# Patient Record
Sex: FEMALE | Race: BLACK OR AFRICAN AMERICAN | Employment: FULL TIME | URBAN - METROPOLITAN AREA
[De-identification: names, ages, dates, MRNs, and addresses within clinical notes are randomized per-mention and may not be internally consistent; named-entity substitution may affect disease eponyms.]

---

## 2017-01-25 ENCOUNTER — OFFICE VISIT (OUTPATIENT)
Dept: INTERNAL MEDICINE CLINIC | Age: 62
End: 2017-01-25

## 2017-01-25 VITALS
SYSTOLIC BLOOD PRESSURE: 128 MMHG | OXYGEN SATURATION: 96 % | HEART RATE: 82 BPM | DIASTOLIC BLOOD PRESSURE: 76 MMHG | TEMPERATURE: 96.6 F | RESPIRATION RATE: 18 BRPM | WEIGHT: 187 LBS | HEIGHT: 66 IN | BODY MASS INDEX: 30.05 KG/M2

## 2017-01-25 DIAGNOSIS — D17.1 LIPOMA OF FLANK: Primary | ICD-10-CM

## 2017-01-25 DIAGNOSIS — H65.02 ACUTE SEROUS OTITIS MEDIA OF LEFT EAR, RECURRENCE NOT SPECIFIED: ICD-10-CM

## 2017-01-25 DIAGNOSIS — K43.9 VENTRAL HERNIA WITHOUT OBSTRUCTION OR GANGRENE: ICD-10-CM

## 2017-01-25 NOTE — MR AVS SNAPSHOT
Visit Information Date & Time Provider Department Dept. Phone Encounter #  
 1/25/2017  3:30 PM Janessa Ortiz MD [facility] Internal Medicine [phone] [id number] Follow-up Instructions Return in about 1 month (around 2/25/2017) for complete physical  and fasting blood work. [place] Upcoming Health Maintenance Date Due Hepatitis C Screening 1955 DTaP/Tdap/Td series (1 - Tdap) 9/20/1978 PAP AKA CERVICAL CYTOLOGY 9/20/1978 BREAST CANCER SCRN MAMMOGRAM 9/20/2007 FOBT Q 1 YEAR AGE 50-75 9/20/2007 INFLUENZA AGE 9 TO ADULT 8/1/2016 Allergies as of 1/25/2017  Review Complete On: 1/25/2017 By: Janessa Ortiz MD  
  
 Severity Noted Reaction Type Reactions Other Food  08/07/2015    Other (comments) POTATOES, BLUEBERRIES, CELERY, TOMATO PER ALLERGY TESTING (RECENT 8/7/15) Iodine  11/10/2010    Hives Other Medication  07/17/2013    Unknown (comments) Seafood-nausea, diarrhea, pain  
 Sulfa (Sulfonamide Antibiotics)  11/10/2010    Swelling Current Immunizations  Never Reviewed No immunizations on file. Not reviewed this visit You Were Diagnosed With   
  
 Codes Comments Lipoma of flank    -  Primary ICD-10-CM: D17.1 ICD-9-CM: 214.1 Vitals BP Pulse Temp Resp Height(growth percentile) Weight(growth percentile) 128/76 (BP Patient Position: Sitting) 82 96.6 °F (35.9 °C) (Oral) 18 5' 6\" (1.676 m) 187 lb (84.8 kg) SpO2 BMI OB Status Smoking Status 96% 30.18 kg/m2 Hysterectomy Former Smoker Vitals History BMI and BSA Data Body Mass Index Body Surface Area  
 30.18 kg/m 2 1.99 m 2 Preferred Pharmacy Pharmacy Name Phone [id number] [place] [place] [phone] Your Updated Medication List  
  
   
This list is accurate as of: 1/25/17  4:15 PM.  Always use your most recent med list.  
  
  
  
  
 Biotin 2,500 mcg Cap Take  by mouth.  
  
 clobetasol 0.05 % topical cream  
Commonly known as:  Waldorf Laila Apply  to affected area two (2) times a day. desloratadine 5 mg tablet Commonly known as:  CLARINEX Take 5 mg by mouth daily as needed for Allergies. HYDROcodone-acetaminophen 5-325 mg per tablet Commonly known as:  Delano Dolphin Take 1 Tab by mouth every four (4) hours as needed for Pain. Max Daily Amount: 6 Tabs. WOMEN'S 50 + VITAPAK 400 mcg/200 mg -240 mg Cmpk Generic drug:  mv-FA-dha-epa-fish-veronica-D3-gink Take  by mouth. Follow-up Instructions Return in about 1 month (around 2/25/2017) for complete physical  and fasting blood work. Jessika Coates Introducing [facility]! Janki Ruff introduces E[employer]-SA[employer] patient portal. Now you can access parts of your medical record, email your doctor's office, and request medication refills online. 1. In your internet browser, go to [email or web address] 2. Click on the First Time User? Click Here link in the Sign In box. You will see the New Member Sign Up page. 3. Enter your ECO-SAFE Access Code exactly as it appears below. You will not need to use this code after youve completed the sign-up process. If you do not sign up before the expiration date, you must request a new code. · ECO-[employer]FE Access Code: [email or web address] Expires: 4/25/2017  4:13 PM 
 
4. Enter the last four digits of your Social Security Number (xxxx) and Date of Birth (mm/dd/yyyy) as indicated and click Submit. You will be taken to the next sign-up page. 5. Create a ECO-SAFE ID. This will be your ECO-SAFE login ID and cannot be changed, so think of one that is secure and easy to remember. 6. Create a ECO-SAFE password. You can change your password at any time. 7. Enter your Password Reset Question and Answer. This can be used at a later time if you forget your password. 8. Enter your e-mail address.  You will receive e-mail notification when new information is available in [employer]. 9. Click Sign Up. You can now view and download portions of your medical record. 10. Click the Download Summary menu link to download a portable copy of your medical information. If you have questions, please visit the Frequently Asked Questions section of the [employer] website. Remember, [employer] is NOT to be used for urgent needs. For medical emergencies, dial 911. Now available from your iPhone and Android! Please provide this summary of care documentation to your next provider. Your primary care clinician is listed as Edgar Be. If you have any questions after today's visit, please call [phone].

## 2017-01-25 NOTE — PROGRESS NOTES
Subjective:     Chief Complaint   Patient presents with    Back Pain     for the past few days         She  is a 63y.o. year old female who presents as a new patient to establish and discuss bout some concern. She reports that initially she made the appointment for back pain but back pain been resolved. H/O removal of lipoma and repair of Ventral hernia in 8/2015. She states that she thinks the hernia is getting bigger but it is not painful or bothering. She also sating the the area where the lipoma was removed from is getting bigger as well. No pain but feels uncomfortable. She also mentioned that she has a h/o ear wax in left ear. Recently her left ear feeling clogged. Pertinent items are noted in HPI. Objective:     Vitals:    01/25/17 1544   BP: 128/76   Pulse: 82   Resp: 18   Temp: 96.6 °F (35.9 °C)   TempSrc: Oral   SpO2: 96%   Weight: 187 lb (84.8 kg)   Height: 5' 6\" (1.676 m)       Physical Examination: General appearance - alert, well appearing, and in no distress, oriented to person, place, and time and overweight  Mental status - alert, oriented to person, place, and time, normal mood, behavior, speech, dress, motor activity, and thought processes  Ears - right ear normal, small serous fluid noted in left ear. Nose - normal and patent, no erythema, discharge or polyps  Mouth - mucous membranes moist, pharynx normal without lesions  Neck - supple, no significant adenopathy  Chest - clear to auscultation, no wheezes, rales or rhonchi, symmetric air entry  Heart - normal rate, regular rhythm, normal S1, S2, no murmurs, rubs, clicks or gallops  Abdomen - soft, nontender, nondistended, no masses or organomegaly. Slight visible swollen right abdominal wall then the left but no obvious hernia noted. Back exam - full range of motion, no tenderness, palpable spasm or pain on motion. 7-8 cm surgical scar ebony noted in the right flank area. Non tender.  Most like the lipoma is coming back or may the scar tissue is bothering her. Allergies   Allergen Reactions    Other Food Other (comments)     POTATOES, BLUEBERRIES, CELERY, TOMATO PER ALLERGY TESTING (RECENT 8/7/15)    Iodine Hives    Other Medication Unknown (comments)     Seafood-nausea, diarrhea, pain    Sulfa (Sulfonamide Antibiotics) Swelling      Social History     Social History    Marital status:      Spouse name: N/A    Number of children: N/A    Years of education: N/A     Social History Main Topics    Smoking status: Former Smoker     Packs/day: 1.00     Years: 6.00     Types: Cigarettes     Quit date: 1/1/2002    Smokeless tobacco: Never Used    Alcohol use 2.4 oz/week     4 Shots of liquor per week      Comment: social    Drug use: No    Sexual activity: Not Asked     Other Topics Concern    None     Social History Narrative      Family History   Problem Relation Age of Onset    Anesth Problems Neg Hx       Past Surgical History   Procedure Laterality Date    Endoscopy, colon, diagnostic       2007    Hx breast biopsy       4 benign breast surgeries-2 on each side    Hx other surgical       CYST EXCISIONS FROM BIRTH CANAL    Hx orthopaedic Bilateral      BONE REMOVAL FROM INNER ASPECT BOTH FEET    Hx lipoma resection  8/14/15     excision of lipomas right flank and right supraumbilical region    Hx gyn  2007     partial hysterectomy. fibroid. Past Medical History   Diagnosis Date    Ill-defined condition 8/7/15     ALLERGIC REACTION CAUSING MANY DARK SPOTS MAINLY ON ARMS & LEGS, PT MEDICATING FOR ITCHING; DENSE SKIN-COLORED RASH ON UPPER BACK    Lipoma of back 7/17/2013    Other ill-defined conditions(799.89)      FIBROCYSTIC BREAST DISEASE; OTHER CYSTS      Current Outpatient Prescriptions   Medication Sig Dispense Refill    mv-FA-dha-epa-fish-veronica-D3-gink (WOMEN'S 50 + VITAPAK) 400 mcg/200 mg -240 mg cmpk Take  by mouth.  Biotin 2,500 mcg cap Take  by mouth.       clobetasol (TEMOVATE) 0.05 % topical cream Apply  to affected area two (2) times a day.  desloratadine (CLARINEX) 5 mg tablet Take 5 mg by mouth daily as needed for Allergies. Assessment/ Plan:   Desi Ku was seen today for back pain. Diagnoses and all orders for this visit:    Lipoma of flank  -   Advised to use a binder or support. Advised not to have any surgery unless it really painful. Ventral hernia without obstruction or gangrene    -  counseled patient and discussed the warning signs when to seek med attention. She will call her surgeon if starts noticing any change. -    Rdoy Carlos or abdominal support discussed. Acute serous otitis media of left ear, recurrence not specified    -  offerred decongestant but she says she does not like to take any med. Will let me know if its worse,          Medication risks/benefits/costs/interactions/alternatives discussed with patient. Advised patient to call back or return to office if symptoms worsen/change/persist. If patient cannot reach us or should anything more severe/urgent arise he/she should proceed directly to the nearest emergency department. Discussed expected course/resolution/complications of diagnosis in detail with patient. Patient given a written after visit summary which includes her diagnoses, current medications and vitals. Patient expressed understanding with the diagnosis and plan. Follow-up Disposition:  Return in about 1 month (around 2/25/2017) for complete physical  and fasting blood work. Nayeli Barraza

## 2018-05-23 ENCOUNTER — OFFICE VISIT (OUTPATIENT)
Dept: INTERNAL MEDICINE CLINIC | Age: 63
End: 2018-05-23

## 2018-05-23 VITALS
HEART RATE: 84 BPM | TEMPERATURE: 97.7 F | WEIGHT: 176.6 LBS | HEIGHT: 66 IN | DIASTOLIC BLOOD PRESSURE: 89 MMHG | OXYGEN SATURATION: 99 % | BODY MASS INDEX: 28.38 KG/M2 | SYSTOLIC BLOOD PRESSURE: 158 MMHG | RESPIRATION RATE: 18 BRPM

## 2018-05-23 DIAGNOSIS — M62.838 TRAPEZIUS MUSCLE SPASM: Primary | ICD-10-CM

## 2018-05-23 DIAGNOSIS — R03.0 ELEVATED BLOOD PRESSURE READING: ICD-10-CM

## 2018-05-23 DIAGNOSIS — M54.2 NECK PAIN: ICD-10-CM

## 2018-05-23 RX ORDER — KETOROLAC TROMETHAMINE 10 MG/1
10 TABLET, FILM COATED ORAL
Qty: 20 TAB | Refills: 0 | Status: SHIPPED | OUTPATIENT
Start: 2018-05-23

## 2018-05-23 RX ORDER — KETOROLAC TROMETHAMINE 30 MG/ML
60 INJECTION, SOLUTION INTRAMUSCULAR; INTRAVENOUS ONCE
Qty: 1 VIAL | Refills: 0
Start: 2018-05-23 | End: 2018-05-23

## 2018-05-23 RX ORDER — CYCLOBENZAPRINE HCL 10 MG
10 TABLET ORAL
Qty: 21 TAB | Refills: 0 | Status: SHIPPED | OUTPATIENT
Start: 2018-05-23

## 2018-05-23 NOTE — MR AVS SNAPSHOT
[facility]. [address] 
[phone] Patient: Benjamin Michael MRN: [id number] [facility]:[birth year] Visit Information Date & Time Provider Department Dept. Phone Encounter #  
 5/23/2018 11:30 AM Edgar Nelson MD [facility] Internal Medicine [phone] [id number] Upcoming Health Maintenance Date Due Hepatitis C Screening 1955 DTaP/Tdap/Td series (1 - Tdap) 9/20/1978 PAP AKA CERVICAL CYTOLOGY 9/20/1978 BREAST CANCER SCRN MAMMOGRAM 9/20/2007 FOBT Q 1 YEAR AGE 50-75 9/20/2007 ZOSTER VACCINE AGE 60> 7/20/2017 Influenza Age 5 to Adult 8/1/2018 Allergies as of 5/23/2018  Review Complete On: 5/23/2018 By: Aminata Rondon LPN Severity Noted Reaction Type Reactions Other Food  08/07/2015    Other (comments) POTATOES, BLUEBERRIES, CELERY, TOMATO PER ALLERGY TESTING (RECENT 8/7/15) Iodine  11/10/2010    Hives Other Medication  07/17/2013    Unknown (comments) Seafood-nausea, diarrhea, pain  
 Sulfa (Sulfonamide Antibiotics)  11/10/2010    Swelling Current Immunizations  Never Reviewed No immunizations on file. Not reviewed this visit You Were Diagnosed With   
  
 Codes Comments Trapezius muscle spasm    -  Primary ICD-10-CM: U48.802 ICD-9-CM: 728.85 Neck pain     ICD-10-CM: M54.2 ICD-9-CM: 723.1 Vitals BP Pulse Temp Resp Height(growth percentile) 158/89 (BP 1 Location: Left arm, BP Patient Position: Sitting) 84 97.7 °F (36.5 °C) (Temporal) 18 5' 6\" (1.676 m) Weight(growth percentile) SpO2 BMI OB Status Smoking Status 176 lb 9.6 oz (80.1 kg) 99% 28.5 kg/m2 Hysterectomy Former Smoker Vitals History BMI and BSA Data Body Mass Index Body Surface Area 28.5 kg/m 2 1.93 m 2 [id number] [place] [facility] [phone] Your Updated Medication List  
  
   
This list is accurate as of 5/23/18 12:28 PM.  Always use your most recent med list.  
  
  
  
  
 Biotin 2,500 mcg Cap Take  by mouth.  
  
 clobetasol 0.05 % topical cream  
Commonly known as:  Rejeana Jerry Apply  to affected area two (2) times a day. cyclobenzaprine 10 mg tablet Commonly known as:  FLEXERIL Take 1 Tab by mouth three (3) times daily as needed for Muscle Spasm(s). desloratadine 5 mg tablet Commonly known as:  CLARINEX Take 5 mg by mouth daily as needed for Allergies. * ketorolac 10 mg tablet Commonly known as:  TORADOL Take 1 Tab by mouth three (3) times daily as needed for Pain. * ketorolac tromethamine 60 mg/2 mL Soln Commonly known as:  TORADOL  
2 mL by IntraMUSCular route once for 1 dose. WOMEN'S 50 + VITAPAK 400 mcg/200 mg -240 mg Cmpk Generic drug:  mv-FA-dha-epa-fish-veronica-D3-gink Take  by mouth. * Notice: This list has 2 medication(s) that are the same as other medications prescribed for you. Read the directions carefully, and ask your doctor or other care provider to review them with you. Prescriptions Sent to Pharmacy Refills  
 ketorolac (TORADOL) 10 mg tablet 0 Sig: Take 1 Tab by mouth three (3) times daily as needed for Pain. Class: Normal  
 Pharmacy: [address] [place] [address] #: [phone] Route: Oral  
 cyclobenzaprine (FLEXERIL) 10 mg tablet 0 Sig: Take 1 Tab by mouth three (3) times daily as needed for Muscle Spasm(s). Class: Normal  
 Pharmacy: [address] [place] [address] Ph #: [phone] Route: Oral  
  
We Performed the Following KETOROLAC TROMETHAMINE INJ [ [facility]] DE THER/PROPH/DIAG INJECTION, SUBCUT/IM [id number] CPT(R)] Patient Instructions Neck Spasm: Care Instructions Your Care Instructions A neck spasm is sudden tightness and pain in your neck muscles. A spasm may be caused by some activities or repeated movements. For example, you may be more likely to have a neck spasm if you slouch, paint a ceiling, work at a computer, or sleep with your neck twisted. But the cause isn't always clear. Home treatment includes using heat or ice, taking over-the-counter (OTC) pain medicines, and avoiding activities that may lead to neck pain. Gentle stretching, or treatments such as massage or manipulation, may also help ease a neck spasm. For a neck spasm that doesn't get better with home care, your doctor may prescribe medicine. He or she may also suggest exercise or physical therapy to help strengthen or relax your neck muscles. Follow-up care is a key part of your treatment and safety. Be sure to make and go to all appointments, and call your doctor if you are having problems. It's also a good idea to know your test results and keep a list of the medicines you take. How can you care for yourself at home? · To relieve pain, use heat or ice (whichever feels better) on the affected area. ¨ Put a warm water bottle, a heating pad set on low, or a warm cloth on your neck. Put a thin cloth between the heating pad and your skin. Do not go to sleep with a heating pad on your skin. ¨ Try ice or a cold pack on the area for 10 to 20 minutes at a time. Put a thin cloth between the ice and your skin. · Ask your doctor if you can take acetaminophen (such as Tylenol) or nonsteroidal anti-inflammatory drugs, such as ibuprofen or naproxen. Your doctor can prescribe stronger medicines if needed. Be safe with medicines. Read and follow all instructions on the label. · Stretch your muscles every day, especially before and after exercise and at bedtime. Regular stretching can help relax your muscles.  
· Try to find a pillow and a position in bed that help improve your night's rest. 
 · Try to stay active. It's best to start activity slowly. If an exercise makes your painworse, stop doing it. When should you call for help? Call 911 anytime you think you may need emergency care. For example, call if: 
? · You are unable to move an arm or a leg at all. ?Call your doctor now or seek immediate medical care if: 
? · You have new or worse symptoms in your arms, legs, belly, or buttocks. Symptoms may include: ¨ Numbness or tingling. ¨ Weakness. ¨ Pain. ? · You lose bladder or bowel control. ? Watch closely for changes in your health, and be sure to contact your doctor if: 
? · You do not get better as expected. Where can you learn more? Go to http://ernesto-shama.info/. Enter P875 in the search box to learn more about \"Neck Spasm: Care Instructions. \" Current as of: March 21, 2017 Content Version: 11.4 © 8273-0193 Exam[employer]. Care instructions adapted under license by [employer] (which disclaims liability or warranty for this information). If you have questions about a medical condition or this instruction, always ask your healthcare professional. [place] [address] any warranty or liability for your use of this information. Introducing [facility]! [employer] introduces [employer] patient portal. Now you can access parts of your medical record, email your doctor's office, and request medication refills online. 1. In your internet browser, go to [email or web address] 2. Click on the First Time User? Click Here link in the Sign In box. You will see the New Member Sign Up page. 3. Enter your [employer] Access Code exactly as it appears below. You will not need to use this code after youve completed the sign-up process. If you do not sign up before the expiration date, you must request a new code. · [employer] Access Code: [email or web address] Expires: 8/21/2018 12:03 PM 
 
 4. Enter the last four digits of your Social Security Number (xxxx) and Date of Birth (mm/dd/yyyy) as indicated and click Submit. You will be taken to the next sign-up page. 5. Create a [employer] ID. This will be your [employer] login ID and cannot be changed, so think of one that is secure and easy to remember. 6. Create a [employer] password. You can change your password at any time. 7. Enter your Password Reset Question and Answer. This can be used at a later time if you forget your password. 8. Enter your e-mail address. You will receive e-mail notification when new information is available in [address] 9. Click Sign Up. You can now view and download portions of your medical record. 10. Click the Download Summary menu link to download a portable copy of your medical information. If you have questions, please visit the Frequently Asked Questions section of the [employer] website. Remember, [employer] is NOT to be used for urgent needs. For medical emergencies, dial 911. Now available from your iPhone and Android! Please provide this summary of care documentation to your next provider. Your primary care clinician is listed as Edgar Be. If you have any questions after today's visit, please call [phone].

## 2018-05-23 NOTE — PROGRESS NOTES
Subjective:     Chief Complaint   Patient presents with    Neck Pain     upper back, lower neck pain since saturday, been getting worst. Radiates to back of head, going down arm, hard to turn head        She  is a 63y.o. year old female with no significant medical history who presents with a c/o severe sharp pain in her left side of the back of the neck started on Saturday morning upon awakening up. States that pain is getting worst . Pain radiates to her back of the left shoulder and pain ful to turn head any direction. Pertinent items are noted in HPI. Objective:     Vitals:    05/23/18 1145 05/23/18 1210 05/23/18 1212 05/23/18 1227   BP: (!) 137/113 145/84 160/88 158/89   Pulse: 84      Resp: 18      Temp: 97.7 °F (36.5 °C)      TempSrc: Temporal      SpO2: 99%      Weight: 176 lb 9.6 oz (80.1 kg)      Height: 5' 6\" (1.676 m)          Physical Examination: General appearance - alert, well appearing, and in pain. oriented to person, place, and time and normal appearing weight  Mental status - alert, oriented to person, place, and time  Ears - bilateral TM's and external ear canals normal  Neck - supple, no significant adenopathy. No swelling, no rash, no redness noted. TTP over left cervical paraspinal and trapezius muscle any ROM is painful.    Neurological - alert, oriented, normal speech, no focal findings or movement disorder noted  Musculoskeletal - shoulder exam is normal.     Allergies   Allergen Reactions    Other Food Other (comments)     POTATOES, BLUEBERRIES, CELERY, TOMATO PER ALLERGY TESTING (RECENT 8/7/15)    Iodine Hives    Other Medication Unknown (comments)     Seafood-nausea, diarrhea, pain    Sulfa (Sulfonamide Antibiotics) Swelling      Social History     Social History    Marital status:      Spouse name: N/A    Number of children: N/A    Years of education: N/A     Social History Main Topics    Smoking status: Former Smoker     Packs/day: 1.00     Years: 6.00     Types: Cigarettes     Quit date: 1/1/2002    Smokeless tobacco: Never Used    Alcohol use 2.4 oz/week     4 Shots of liquor per week      Comment: social    Drug use: No    Sexual activity: Not Asked     Other Topics Concern    None     Social History Narrative      Family History   Problem Relation Age of Onset    Anesth Problems Neg Hx       Past Surgical History:   Procedure Laterality Date    ENDOSCOPY, COLON, DIAGNOSTIC      2007    HX BREAST BIOPSY      4 benign breast surgeries-2 on each side    HX GYN  2007    partial hysterectomy. fibroid.  HX LIPOMA RESECTION  8/14/15    excision of lipomas right flank and right supraumbilical region    HX ORTHOPAEDIC Bilateral     BONE REMOVAL FROM INNER ASPECT BOTH FEET    HX OTHER SURGICAL      CYST EXCISIONS FROM BIRTH CANAL      Past Medical History:   Diagnosis Date    Ill-defined condition 8/7/15    ALLERGIC REACTION CAUSING MANY DARK SPOTS MAINLY ON ARMS & LEGS, PT MEDICATING FOR ITCHING; DENSE SKIN-COLORED RASH ON UPPER BACK    Lipoma of back 7/17/2013    Other ill-defined conditions(799.89)     FIBROCYSTIC BREAST DISEASE; OTHER CYSTS      Current Outpatient Prescriptions   Medication Sig Dispense Refill    ketorolac (TORADOL) 10 mg tablet Take 1 Tab by mouth three (3) times daily as needed for Pain. 20 Tab 0    cyclobenzaprine (FLEXERIL) 10 mg tablet Take 1 Tab by mouth three (3) times daily as needed for Muscle Spasm(s). 21 Tab 0    ketorolac tromethamine (TORADOL) 60 mg/2 mL soln 2 mL by IntraMUSCular route once for 1 dose. 1 Vial 0    mv-FA-dha-epa-fish-veronica-D3-gink (WOMEN'S 50 + VITAPAK) 400 mcg/200 mg -240 mg cmpk Take  by mouth.  Biotin 2,500 mcg cap Take  by mouth.  clobetasol (TEMOVATE) 0.05 % topical cream Apply  to affected area two (2) times a day.  desloratadine (CLARINEX) 5 mg tablet Take 5 mg by mouth daily as needed for Allergies. Assessment/ Plan:   Diagnoses and all orders for this visit:    1.  Trapezius muscle spasm  -     ketorolac (TORADOL) 10 mg tablet; Take 1 Tab by mouth three (3) times daily as needed for Pain. -     cyclobenzaprine (FLEXERIL) 10 mg tablet; Take 1 Tab by mouth three (3) times daily as needed for Muscle Spasm(s). -     ketorolac tromethamine (TORADOL) 60 mg/2 mL soln; 2 mL by IntraMUSCular route once for 1 dose. -     KETOROLAC TROMETHAMINE INJ  -     MS THER/PROPH/DIAG INJECTION, SUBCUT/IM         -   heat and ice therapy. 2. Neck pain  -     ketorolac (TORADOL) 10 mg tablet; Take 1 Tab by mouth three (3) times daily as needed for Pain.  -     ketorolac tromethamine (TORADOL) 60 mg/2 mL soln; 2 mL by IntraMUSCular route once for 1 dose. -     KETOROLAC TROMETHAMINE INJ  -     MS THER/PROPH/DIAG INJECTION, SUBCUT/IM    3. Elevated blood pressure reading       No h/o elevated blood pressure in the past. Pt is in pain. Advised to start monitoring BP daily and bring records if BP stays over 140/90s. Medication risks/benefits/costs/interactions/alternatives discussed with patient. Advised patient to call back or return to office if symptoms worsen/change/persist. If patient cannot reach us or should anything more severe/urgent arise he/she should proceed directly to the nearest emergency department. Discussed expected course/resolution/complications of diagnosis in detail with patient. Patient given a written after visit summary which includes her diagnoses, current medications and vitals. Patient expressed understanding with the diagnosis and plan.        Follow-up Disposition: Not on File

## 2018-05-23 NOTE — PATIENT INSTRUCTIONS
Neck Spasm: Care Instructions  Your Care Instructions  A neck spasm is sudden tightness and pain in your neck muscles. A spasm may be caused by some activities or repeated movements. For example, you may be more likely to have a neck spasm if you slouch, paint a ceiling, work at a computer, or sleep with your neck twisted. But the cause isn't always clear. Home treatment includes using heat or ice, taking over-the-counter (OTC) pain medicines, and avoiding activities that may lead to neck pain. Gentle stretching, or treatments such as massage or manipulation, may also help ease a neck spasm. For a neck spasm that doesn't get better with home care, your doctor may prescribe medicine. He or she may also suggest exercise or physical therapy to help strengthen or relax your neck muscles. Follow-up care is a key part of your treatment and safety. Be sure to make and go to all appointments, and call your doctor if you are having problems. It's also a good idea to know your test results and keep a list of the medicines you take. How can you care for yourself at home? · To relieve pain, use heat or ice (whichever feels better) on the affected area. ¨ Put a warm water bottle, a heating pad set on low, or a warm cloth on your neck. Put a thin cloth between the heating pad and your skin. Do not go to sleep with a heating pad on your skin. ¨ Try ice or a cold pack on the area for 10 to 20 minutes at a time. Put a thin cloth between the ice and your skin. · Ask your doctor if you can take acetaminophen (such as Tylenol) or nonsteroidal anti-inflammatory drugs, such as ibuprofen or naproxen. Your doctor can prescribe stronger medicines if needed. Be safe with medicines. Read and follow all instructions on the label. · Stretch your muscles every day, especially before and after exercise and at bedtime. Regular stretching can help relax your muscles.   · Try to find a pillow and a position in bed that help improve your night's rest.  · Try to stay active. It's best to start activity slowly. If an exercise makes your painworse, stop doing it. When should you call for help? Call 911 anytime you think you may need emergency care. For example, call if:  ? · You are unable to move an arm or a leg at all. ?Call your doctor now or seek immediate medical care if:  ? · You have new or worse symptoms in your arms, legs, belly, or buttocks. Symptoms may include:  ¨ Numbness or tingling. ¨ Weakness. ¨ Pain. ? · You lose bladder or bowel control. ? Watch closely for changes in your health, and be sure to contact your doctor if:  ? · You do not get better as expected. Where can you learn more? Go to http://ernesto-shama.info/. Enter B343 in the search box to learn more about \"Neck Spasm: Care Instructions. \"  Current as of: March 21, 2017  Content Version: 11.4  © 4122-9633 [employer]. Care instructions adapted under license by [employer] (which disclaims liability or warranty for this information). If you have questions about a medical condition or this instruction, always ask your healthcare professional. [place] [address] any warranty or liability for your use of this information.

## 2019-12-09 ENCOUNTER — HOSPITAL ENCOUNTER (OUTPATIENT)
Dept: GENERAL RADIOLOGY | Age: 64
Discharge: HOME | End: 2019-12-09
Payer: COMMERCIAL

## 2019-12-09 ENCOUNTER — OFFICE VISIT (OUTPATIENT)
Dept: PRIMARY CARE CLINIC | Age: 64
End: 2019-12-09

## 2019-12-09 VITALS
SYSTOLIC BLOOD PRESSURE: 148 MMHG | OXYGEN SATURATION: 100 % | WEIGHT: 185 LBS | HEIGHT: 66 IN | RESPIRATION RATE: 16 BRPM | BODY MASS INDEX: 29.73 KG/M2 | TEMPERATURE: 99.3 F | DIASTOLIC BLOOD PRESSURE: 69 MMHG | HEART RATE: 85 BPM

## 2019-12-09 DIAGNOSIS — M54.6 LEFT-SIDED THORACIC BACK PAIN, UNSPECIFIED CHRONICITY: ICD-10-CM

## 2019-12-09 DIAGNOSIS — R05.9 COUGH: ICD-10-CM

## 2019-12-09 DIAGNOSIS — M54.6 LEFT-SIDED THORACIC BACK PAIN, UNSPECIFIED CHRONICITY: Primary | ICD-10-CM

## 2019-12-09 PROCEDURE — 72072 X-RAY EXAM THORAC SPINE 3VWS: CPT

## 2019-12-09 RX ORDER — BENZONATATE 200 MG/1
200 CAPSULE ORAL
Qty: 21 CAP | Refills: 0 | Status: SHIPPED | OUTPATIENT
Start: 2019-12-09 | End: 2019-12-16

## 2019-12-09 RX ORDER — KETOROLAC TROMETHAMINE 30 MG/ML
60 INJECTION, SOLUTION INTRAMUSCULAR; INTRAVENOUS ONCE
Qty: 1 VIAL | Refills: 0
Start: 2019-12-09 | End: 2019-12-09

## 2019-12-09 RX ORDER — IBUPROFEN 800 MG/1
800 TABLET ORAL
Qty: 21 TAB | Refills: 0 | Status: SHIPPED | OUTPATIENT
Start: 2019-12-09

## 2019-12-09 NOTE — PROGRESS NOTES
[facility]   [address] [facility], [facility]  P: [phone]  F: [phone]      Chief Complaint   Patient presents with    Back Pain     patient states that she has had upper left back pain for 2 weeks and it is not getting better. tryine to get a cold too and would like some cough medication. Hazel Lipscomb is a 60 y.o. female who presents to clinic for Back Pain (patient states that she has had upper left back pain for 2 weeks and it is not getting better. tryine to get a cold too and would like some cough medication. ). HPI:    Erin Decker is a 82-year-old female who presents with 2 weeks of mid left-sided back pain. She denies any known injury, but endorses pain that is persisting and worsening over the last 2 weeks. She denies any other associated symptoms today. She has intermittently taken some Advil with benefit. She notes she does carry two heavy bags to work daily. Pt denies neck pain, headache, abdominal pain, saddle anesthesia, bowel/bladder issues. Patient has no significant red flags for low back pain including no history of cancer, corticosteroid use, abnormal neurologic physical findings (including new ataxia and/or difficulty walking), or anticoagulant use. She also endorses 1 week of cold symptoms including cough, nasal congestion, and has a low-grade temperature today of 99.3F. She has not taken any OTC medicine and is requesting cough medicine today.      Patient Active Problem List    Diagnosis    Ventral hernia    Lipoma of back          Past Medical History:   Diagnosis Date    Ill-defined condition 8/7/15    ALLERGIC REACTION CAUSING MANY DARK SPOTS MAINLY ON ARMS & LEGS, PT MEDICATING FOR ITCHING; DENSE SKIN-COLORED RASH ON UPPER BACK    Lipoma of back 7/17/2013    Other ill-defined conditions(799.89)     FIBROCYSTIC BREAST DISEASE; OTHER CYSTS     Past Surgical History:   Procedure Laterality Date    ENDOSCOPY, COLON, DIAGNOSTIC          HX BREAST BIOPSY      4 benign breast surgeries-2 on each side    HX GYN  2007    partial hysterectomy. fibroid.  HX LIPOMA RESECTION  8/14/15    excision of lipomas right flank and right supraumbilical region    HX ORTHOPAEDIC Bilateral     BONE REMOVAL FROM INNER ASPECT BOTH FEET    HX OTHER SURGICAL      CYST EXCISIONS FROM BIRTH CANAL     Social History     Socioeconomic History    Marital status:      Spouse name: Not on file    Number of children: Not on file    Years of education: Not on file    Highest education level: Not on file   Occupational History    Not on file   Social Needs    Financial resource strain: Not on file    Food insecurity:     Worry: Not on file     Inability: Not on file    Transportation needs:     Medical: Not on file     Non-medical: Not on file   Tobacco Use    Smoking status: Former Smoker     Packs/day: 1.00     Years: 6.00     Pack years: 6.00     Types: Cigarettes     Last attempt to quit: 2002     Years since quittin.9    Smokeless tobacco: Never Used   Substance and Sexual Activity    Alcohol use:  Yes     Alcohol/week: 4.0 standard drinks     Types: 4 Shots of liquor per week     Comment: social    Drug use: No    Sexual activity: Not on file   Lifestyle    Physical activity:     Days per week: Not on file     Minutes per session: Not on file    Stress: Not on file   Relationships    Social connections:     Talks on phone: Not on file     Gets together: Not on file     Attends [employer] service: Not on file     Active member of club or organization: Not on file     Attends meetings of clubs or organizations: Not on file     Relationship status: Not on file    Intimate partner violence:     Fear of current or ex partner: Not on file     Emotionally abused: Not on file     Physically abused: Not on file     Forced sexual activity: Not on file   Other Topics Concern    Not on file   Social History Narrative    Not on file     Family History   Problem Relation Age of Onset    Anesth Problems Neg Hx      Allergies   Allergen Reactions    Other Food Other (comments)     POTATOES, BLUEBERRIES, CELERY, TOMATO PER ALLERGY TESTING (RECENT 8/7/15)    Iodine Hives    Other Medication Unknown (comments)     Seafood-nausea, diarrhea, pain    Sulfa (Sulfonamide Antibiotics) Swelling       Current Outpatient Medications   Medication Sig Dispense Refill    benzonatate (TESSALON) 200 mg capsule Take 1 Cap by mouth three (3) times daily as needed for Cough for up to 7 days. 21 Cap 0    ibuprofen (MOTRIN) 800 mg tablet Take 1 Tab by mouth every eight (8) hours as needed for Pain. 21 Tab 0    ketorolac (TORADOL) 10 mg tablet Take 1 Tab by mouth three (3) times daily as needed for Pain. 20 Tab 0    cyclobenzaprine (FLEXERIL) 10 mg tablet Take 1 Tab by mouth three (3) times daily as needed for Muscle Spasm(s). 21 Tab 0    mv-FA-dha-epa-fish-veronica-D3-gink (WOMEN'S 50 + VITAPAK) 400 mcg/200 mg -240 mg cmpk Take  by mouth.  Biotin 2,500 mcg cap Take  by mouth.  clobetasol (TEMOVATE) 0.05 % topical cream Apply  to affected area two (2) times a day.  desloratadine (CLARINEX) 5 mg tablet Take 5 mg by mouth daily as needed for Allergies. The medications were reviewed and updated in the medical record. The past medical history, past surgical history, and family history were reviewed and updated in the medical record. REVIEW OF SYSTEMS   Review of Systems   Constitutional: Negative for malaise/fatigue. HENT: Positive for congestion. Eyes: Negative for blurred vision and pain. Respiratory: Positive for cough. Negative for shortness of breath. Cardiovascular: Negative for chest pain and palpitations. Gastrointestinal: Negative for abdominal pain and heartburn. Genitourinary: Negative for frequency and urgency. Musculoskeletal: Positive for back pain and myalgias. Negative for joint pain.    Neurological: Negative for dizziness, tingling, sensory change, weakness and headaches. Psychiatric/Behavioral: Negative for depression, memory loss and substance abuse. PHYSICAL EXAM     Visit Vitals  /69 (BP 1 Location: Left arm, BP Patient Position: Sitting)   Pulse 85   Temp 99.3 °F (37.4 °C) (Oral)   Resp 16   Ht 5' 6\" (1.676 m)   Wt 185 lb (83.9 kg)   SpO2 100%   BMI 29.86 kg/m²       Physical Exam  Vitals signs and nursing note reviewed. HENT:      Head: Normocephalic and atraumatic. Right Ear: External ear normal.      Left Ear: External ear normal.   Cardiovascular:      Rate and Rhythm: Normal rate and regular rhythm. Heart sounds: Normal heart sounds. Pulmonary:      Effort: Pulmonary effort is normal.      Breath sounds: Normal breath sounds. Musculoskeletal: Normal range of motion. Thoracic back: She exhibits pain and spasm. Comments: Left mid back TTP, no sciatica. Skin:     General: Skin is warm and dry. Neurological:      Mental Status: She is alert and oriented to person, place, and time. Gait: Gait is intact. Deep Tendon Reflexes:      Reflex Scores:       Patellar reflexes are 2+ on the right side and 2+ on the left side. Psychiatric:         Mood and Affect: Affect normal.         Judgment: Judgment normal.       ASSESSMENT/ PLAN   Diagnoses and all orders for this visit:    1. Left-sided thoracic back pain, unspecified chronicity  -     XR SPINE THORAC 3 V; Future  -     ibuprofen (MOTRIN) 800 mg tablet; Take 1 Tab by mouth every eight (8) hours as needed for Pain.  -     ketorolac tromethamine (TORADOL) 60 mg/2 mL soln; 2 mL by IntraMUSCular route once for 1 dose. -     KETOROLAC TROMETHAMINE INJ  -     LA THER/PROPH/DIAG INJECTION, SUBCUT/IM  -Rest, ice, given IM Toradol in office and provided Motrin to take for the next several days. Patient requesting imaging. 2. Cough  -     benzonatate (TESSALON) 200 mg capsule;  Take 1 Cap by mouth three (3) times daily as needed for Cough for up to 7 days.   -Return to office if not improving. Disclaimer:  Advised patient to call back or return to office if symptoms worsen/change/persist.  Discussed expected course/resolution/complications of diagnosis in detail with patient.     Medication risks/benefits/alternatives discussed with patient. Patient was given an after visit summary which includes diagnoses, current medications, & vitals.      Discussed patient instructions and advised to read to all patient instructions regarding care.      Patient expressed understanding with the diagnosis and plan. This note will not be viewable in [address]         Fidel Hurst NP  12/9/2019        (This document has been electronically signed)

## 2019-12-09 NOTE — PROGRESS NOTES
Chief Complaint   Patient presents with    Back Pain     patient states that she has had upper left back pain for 2 weeks and it is not getting better. tryine to get a cold too and would like some cough medication.

## 2019-12-10 NOTE — PROGRESS NOTES
Please call- Xray shows some mild spinal arthritis but no acute fracture. Continue rest and motrin- follow-up if not improving.

## 2019-12-10 NOTE — PROGRESS NOTES
Attempted to call patient x 2, call goes to , however, unable to leave . Result letter sent. End of encounter.

## 2020-12-11 ENCOUNTER — OFFICE VISIT (OUTPATIENT)
Dept: PRIMARY CARE CLINIC | Age: 65
End: 2020-12-11
Payer: COMMERCIAL

## 2020-12-11 VITALS
HEART RATE: 75 BPM | TEMPERATURE: 98.7 F | WEIGHT: 182 LBS | BODY MASS INDEX: 29.25 KG/M2 | RESPIRATION RATE: 16 BRPM | SYSTOLIC BLOOD PRESSURE: 148 MMHG | OXYGEN SATURATION: 100 % | DIASTOLIC BLOOD PRESSURE: 88 MMHG | HEIGHT: 66 IN

## 2020-12-11 DIAGNOSIS — R03.0 ELEVATED BLOOD PRESSURE READING: Primary | ICD-10-CM

## 2020-12-11 DIAGNOSIS — Z12.4 SCREENING FOR MALIGNANT NEOPLASM OF CERVIX: ICD-10-CM

## 2020-12-11 PROCEDURE — 99396 PREV VISIT EST AGE 40-64: CPT | Performed by: NURSE PRACTITIONER

## 2020-12-11 NOTE — PROGRESS NOTES
Hadley Ott is a 63 y.o. female  Chief Complaint   Patient presents with    Complete Physical     Health Maintenance Due   Topic Date Due    Hepatitis C Screening  1955    DTaP/Tdap/Td series (1 - Tdap) 09/20/1978    PAP AKA CERVICAL CYTOLOGY  09/20/1978    Lipid Screen  09/20/1997    Shingrix Vaccine Age 50> (1 of 2) 09/20/2007    Colorectal Cancer Screening Combo  09/20/2007    Flu Vaccine (1) 09/01/2020    Breast Cancer Screen Mammogram  09/19/2020     Visit Vitals  BP (!) 148/88 (BP 1 Location: Right arm, BP Patient Position: Sitting)   Pulse 75   Temp 98.7 °F (37.1 °C) (Temporal)   Resp 16   Ht 5' 6\" (1.676 m)   Wt 182 lb (82.6 kg)   SpO2 100%   BMI 29.38 kg/m²     1. Have you been to the ER, urgent care clinic since your last visit? Hospitalized since your last visit? No    2. Have you seen or consulted any other health care providers outside of the [address] [place] [address] since your last visit? Include any pap smears or colon screening.  No

## 2020-12-11 NOTE — PROGRESS NOTES
Jareth Mathis is a  63 y.o. female presents for visit. Chief Complaint   Patient presents with    Gyn Exam     PAP     HPI  Established patient new to me. Presents for Pap only. Last Pap was more than 3 years ago. She is sexually active with her . Followed by OB/GYN elsewhere. Denies need for mammogram order. No additional concerns. Review of Systems   Constitutional: Negative for chills, fever and malaise/fatigue. HENT: Negative for congestion and sore throat. Respiratory: Negative for cough and shortness of breath. Cardiovascular: Negative for chest pain. Gastrointestinal: Negative for diarrhea, heartburn and nausea. Genitourinary: Negative. Musculoskeletal: Negative for myalgias. Neurological: Negative for headaches. Past Medical History:   Diagnosis Date    Ill-defined condition 8/7/15    ALLERGIC REACTION CAUSING MANY DARK SPOTS MAINLY ON ARMS & LEGS, PT MEDICATING FOR ITCHING; DENSE SKIN-COLORED RASH ON UPPER BACK    Lipoma of back 7/17/2013    Other ill-defined conditions(799.89)     FIBROCYSTIC BREAST DISEASE; OTHER CYSTS     Past Surgical History:   Procedure Laterality Date    ENDOSCOPY, COLON, DIAGNOSTIC      2007    HX BREAST BIOPSY      4 benign breast surgeries-2 on each side    HX GYN  2007    partial hysterectomy. fibroid.     HX LIPOMA RESECTION  8/14/15    excision of lipomas right flank and right supraumbilical region    HX ORTHOPAEDIC Bilateral     BONE REMOVAL FROM INNER ASPECT BOTH FEET    HX OTHER SURGICAL      CYST EXCISIONS FROM BIRTH CANAL    HX WISDOM TEETH EXTRACTION  11/22/2020       Social History     Socioeconomic History    Marital status:      Spouse name: Not on file    Number of children: Not on file    Years of education: Not on file    Highest education level: Not on file   Occupational History    Not on file   Social Needs    Financial resource strain: Not on file    Food insecurity     Worry: Not on file Inability: Not on file    Transportation needs     Medical: Not on file     Non-medical: Not on file   Tobacco Use    Smoking status: Former Smoker     Packs/day: 1.00     Years: 6.00     Pack years: 6.00     Types: Cigarettes     Quit date: 2002     Years since quittin.9    Smokeless tobacco: Never Used   Substance and Sexual Activity    Alcohol use: Yes     Alcohol/week: 4.0 standard drinks     Types: 4 Shots of liquor per week     Comment: social    Drug use: No    Sexual activity: Yes   Lifestyle    Physical activity     Days per week: Not on file     Minutes per session: Not on file    Stress: Not on file   Relationships    Social connections     Talks on phone: Not on file     Gets together: Not on file     Attends [employer] service: Not on file     Active member of club or organization: Not on file     Attends meetings of clubs or organizations: Not on file     Relationship status: Not on file    Intimate partner violence     Fear of current or ex partner: Not on file     Emotionally abused: Not on file     Physically abused: Not on file     Forced sexual activity: Not on file   Other Topics Concern    Not on file   Social History Narrative    Not on file       Family History   Problem Relation Age of Onset    Anesth Problems Neg Hx       Prior to Admission medications    Medication Sig Start Date End Date Taking? Authorizing Provider   ibuprofen (MOTRIN) 800 mg tablet Take 1 Tab by mouth every eight (8) hours as needed for Pain. 19   Parveen Sams NP   ketorolac (TORADOL) 10 mg tablet Take 1 Tab by mouth three (3) times daily as needed for Pain. 18   Edgar Be MD   cyclobenzaprine (FLEXERIL) 10 mg tablet Take 1 Tab by mouth three (3) times daily as needed for Muscle Spasm(s). 18   Edgar Be MD   mv-FA-dha-epa-fish-veronica-D3-gink (WOMEN'S 50 + VITAPAK) 400 mcg/200 mg -240 mg cmpk Take  by mouth. Provider, Historical   Biotin 2,500 mcg cap Take  by mouth. Provider, Historical   clobetasol (TEMOVATE) 0.05 % topical cream Apply  to affected area two (2) times a day. Provider, Historical   desloratadine (CLARINEX) 5 mg tablet Take 5 mg by mouth daily as needed for Allergies. Provider, Historical      Allergies   Allergen Reactions    Other Food Other (comments)     POTATOES, BLUEBERRIES, CELERY, TOMATO PER ALLERGY TESTING (RECENT 8/7/15)    Iodine Hives    Other Medication Unknown (comments)     Seafood-nausea, diarrhea, pain    Sulfa (Sulfonamide Antibiotics) Swelling        Visit Vitals  BP (!) 148/88 (BP 1 Location: Right arm, BP Patient Position: Sitting)   Pulse 75   Temp 98.7 °F (37.1 °C) (Temporal)   Resp 16   Ht 5' 6\" (1.676 m)   Wt 182 lb (82.6 kg)   SpO2 100%   BMI 29.38 kg/m²     Physical Exam  Vitals signs and nursing note reviewed. Exam conducted with a chaperone present. Constitutional:       General: She is not in acute distress. Appearance: She is well-developed. HENT:      Head: Normocephalic and atraumatic. Right Ear: External ear normal.      Left Ear: External ear normal.   Eyes:      Conjunctiva/sclera: Conjunctivae normal.   Cardiovascular:      Rate and Rhythm: Normal rate and regular rhythm. Heart sounds: Normal heart sounds. Pulmonary:      Effort: Pulmonary effort is normal.      Breath sounds: Normal breath sounds. No wheezing. Genitourinary:     General: Normal vulva. Exam position: Lithotomy position. Labia:         Right: No rash. Left: No rash. Vagina: Tenderness present. No vaginal discharge. Skin:     General: Skin is warm and dry. Neurological:      Mental Status: She is alert and oriented to person, place, and time. Psychiatric:         Behavior: Behavior normal.               No results found for this or any previous visit (from the past 24 hour(s)).     Patient Active Problem List    Diagnosis Date Noted    Ventral hernia 07/17/2013    Lipoma of back 07/17/2013 ASSESSMENT AND PLAN:      ICD-10-CM ICD-9-CM   1. Elevated blood pressure reading  R03.0 796.2   2. Screening for malignant neoplasm of cervix  Z12.4 V76.2     Orders Placed This Encounter    PAP IGP,APTIMA HPV AGE GDLN     Order Specific Question:   Pap Source? Answer:   Vaginal     Order Specific Question:   Total Hysterectomy? Answer:   No     Order Specific Question:   Supracervical Hysterectomy? Answer:   Yes     Order Specific Question:   Post Menopausal?     Answer:   Yes     Order Specific Question:   Hormone Therapy? Answer:   No     Order Specific Question:   IUD? Answer:   No     Order Specific Question:   Abnormal Bleeding? Answer:   No     Order Specific Question:   Pregnant     Answer:   No     Order Specific Question:   Post Partum? Answer:   No     Order Specific Question:   Pap collection method? Answer:   broom     Diagnoses and all orders for this visit:    1. Elevated blood pressure reading          -Monitor blood pressure at home. Follow-up in does not return to baseline. 2. Screening for malignant neoplasm of cervix  -     PAP IGP,APTIMA HPV AGE GDLN        lab results and schedule of future lab studies reviewed with patient          Disclaimer:  Advised her to call back or return to office if symptoms worsen/change/persist.  Discussed expected course/resolution/complications of diagnosis in detail with patient. Medication risks/benefits/alternatives discussed with patient. She was given an after visit summary which includes diagnoses, current medications, & vitals. Discussed patient instructions and advised to read to all patient instructions regarding care. She expressed understanding with the diagnosis and plan.

## 2020-12-14 ENCOUNTER — DOCUMENTATION ONLY (OUTPATIENT)
Dept: PRIMARY CARE CLINIC | Age: 65
End: 2020-12-14

## 2020-12-30 LAB
AGE GDLN ACOG TESTING, 191185: NORMAL
CYTOLOGIST CVX/VAG CYTO: NORMAL
CYTOLOGY CVX/VAG DOC CYTO: NORMAL
CYTOLOGY CVX/VAG DOC THIN PREP: NORMAL
DX ICD CODE: NORMAL
HPV I/H RISK 4 DNA CVX QL PROBE+SIG AMP: NEGATIVE
Lab: NORMAL
OTHER STN SPEC: NORMAL
SPECIMEN STATUS REPORT, ROLRST: NORMAL
STAT OF ADQ CVX/VAG CYTO-IMP: NORMAL

## 2020-12-31 NOTE — PROGRESS NOTES
Called patient, identified patient with , and advised her of pap being normal.  Patient appreciated.

## 2022-05-31 ENCOUNTER — TELEPHONE (OUTPATIENT)
Dept: PRIMARY CARE CLINIC | Age: 67
End: 2022-05-31

## 2022-05-31 ENCOUNTER — NURSE TRIAGE (OUTPATIENT)
Dept: OTHER | Facility: CLINIC | Age: 67
End: 2022-05-31

## 2022-05-31 NOTE — TELEPHONE ENCOUNTER
Received call from [place] at [facility] with Red Flag Complaint; Patient with Red Flag Complaint requesting to establish care with [facility]. Subjective: Caller states \"vertigo\"     Current Symptoms: vertigo started on Sunday that comes and goes, hx vertigo approx 10 years ago, symptoms have resolves, states cannot move fast or bend down quickly, denies SOB, denies chest pain, denies weakness,     Onset: 3 days ago; intermittent    Associated Symptoms: NA    Pain Severity: 0/10; na; na    Temperature: denies     What has been tried: advil, rest--helps     LMP: NA Pregnant: NA    Recommended disposition: See in Office Within 2 Weeks    Care advice provided, patient verbalizes understanding; denies any other questions or concerns; instructed to call back for any new or worsening symptoms. Patient/Caller agrees with recommended disposition; writer provided warm transfer to [place] at [facility] for appointment scheduling    Attention Provider: Thank you for allowing me to participate in the care of your patient. The patient was connected to triage in response to information provided to the [facility]. Please do not respond through this encounter as the response is not directed to a shared pool.         Reason for Disposition   Dizziness not present now, but is a chronic symptom (recurrent or ongoing AND lasting > 4 weeks)    Protocols used: [id number]

## 2022-05-31 NOTE — TELEPHONE ENCOUNTER
----- Message from Erinn 143 sent at 5/31/2022 11:47 AM EDT -----  Subject: Message to Provider    QUESTIONS  Information for Provider? Patient called in with concerns about her bp   being high, she refused NT at this time. No other info was provided  ---------------------------------------------------------------------------  --------------  CALL BACK INFO  What is the best way for the office to contact you? Do not leave any   message, patient will call back for answer  Preferred Call Back Phone Number? [phone]  ---------------------------------------------------------------------------  --------------  SCRIPT ANSWERS  Relationship to Patient?  Self

## 2022-06-01 ENCOUNTER — NURSE TRIAGE (OUTPATIENT)
Dept: OTHER | Facility: CLINIC | Age: 67
End: 2022-06-01

## 2022-06-02 ENCOUNTER — TELEPHONE (OUTPATIENT)
Dept: PRIMARY CARE CLINIC | Age: 67
End: 2022-06-02

## 2022-06-02 NOTE — TELEPHONE ENCOUNTER
----- Message from Erinn 143 sent at 5/31/2022 11:47 AM EDT -----  Subject: Message to Provider    QUESTIONS  Information for Provider? Patient called in with concerns about her bp   being high, she refused NT at this time. No other info was provided  ---------------------------------------------------------------------------  --------------  CALL BACK INFO  What is the best way for the office to contact you? Do not leave any   message, patient will call back for answer  Preferred Call Back Phone Number? [phone]  ---------------------------------------------------------------------------  --------------  SCRIPT ANSWERS  Relationship to Patient?  Self

## 2022-10-05 LAB — MAMMOGRAPHY, EXTERNAL: NORMAL

## 2022-12-13 ENCOUNTER — OFFICE VISIT (OUTPATIENT)
Dept: INTERNAL MEDICINE CLINIC | Age: 67
End: 2022-12-13
Payer: MEDICARE

## 2022-12-13 ENCOUNTER — PATIENT OUTREACH (OUTPATIENT)
Dept: CASE MANAGEMENT | Age: 67
End: 2022-12-13

## 2022-12-13 VITALS
OXYGEN SATURATION: 99 % | BODY MASS INDEX: 29.7 KG/M2 | RESPIRATION RATE: 16 BRPM | SYSTOLIC BLOOD PRESSURE: 154 MMHG | DIASTOLIC BLOOD PRESSURE: 88 MMHG | HEART RATE: 78 BPM | TEMPERATURE: 97.3 F | WEIGHT: 184.8 LBS | HEIGHT: 66 IN

## 2022-12-13 DIAGNOSIS — M25.562 CHRONIC PAIN OF LEFT KNEE: ICD-10-CM

## 2022-12-13 DIAGNOSIS — I10 PRIMARY HYPERTENSION: ICD-10-CM

## 2022-12-13 DIAGNOSIS — Z00.00 ROUTINE PHYSICAL EXAMINATION: ICD-10-CM

## 2022-12-13 DIAGNOSIS — Z78.0 POST-MENOPAUSE: ICD-10-CM

## 2022-12-13 DIAGNOSIS — Z13.9 SCREENING DUE: ICD-10-CM

## 2022-12-13 DIAGNOSIS — G89.29 CHRONIC PAIN OF LEFT KNEE: ICD-10-CM

## 2022-12-13 DIAGNOSIS — S46.001A INJURY OF RIGHT ROTATOR CUFF, INITIAL ENCOUNTER: Primary | ICD-10-CM

## 2022-12-13 DIAGNOSIS — M79.672 LEFT FOOT PAIN: ICD-10-CM

## 2022-12-13 PROCEDURE — 1090F PRES/ABSN URINE INCON ASSESS: CPT | Performed by: STUDENT IN AN ORGANIZED HEALTH CARE EDUCATION/TRAINING PROGRAM

## 2022-12-13 PROCEDURE — G8428 CUR MEDS NOT DOCUMENT: HCPCS | Performed by: STUDENT IN AN ORGANIZED HEALTH CARE EDUCATION/TRAINING PROGRAM

## 2022-12-13 PROCEDURE — G8417 CALC BMI ABV UP PARAM F/U: HCPCS | Performed by: STUDENT IN AN ORGANIZED HEALTH CARE EDUCATION/TRAINING PROGRAM

## 2022-12-13 PROCEDURE — G8510 SCR DEP NEG, NO PLAN REQD: HCPCS | Performed by: STUDENT IN AN ORGANIZED HEALTH CARE EDUCATION/TRAINING PROGRAM

## 2022-12-13 PROCEDURE — 3017F COLORECTAL CA SCREEN DOC REV: CPT | Performed by: STUDENT IN AN ORGANIZED HEALTH CARE EDUCATION/TRAINING PROGRAM

## 2022-12-13 PROCEDURE — G0463 HOSPITAL OUTPT CLINIC VISIT: HCPCS | Performed by: STUDENT IN AN ORGANIZED HEALTH CARE EDUCATION/TRAINING PROGRAM

## 2022-12-13 PROCEDURE — 1101F PT FALLS ASSESS-DOCD LE1/YR: CPT | Performed by: STUDENT IN AN ORGANIZED HEALTH CARE EDUCATION/TRAINING PROGRAM

## 2022-12-13 PROCEDURE — G8536 NO DOC ELDER MAL SCRN: HCPCS | Performed by: STUDENT IN AN ORGANIZED HEALTH CARE EDUCATION/TRAINING PROGRAM

## 2022-12-13 PROCEDURE — G9899 SCRN MAM PERF RSLTS DOC: HCPCS | Performed by: STUDENT IN AN ORGANIZED HEALTH CARE EDUCATION/TRAINING PROGRAM

## 2022-12-13 PROCEDURE — 99204 OFFICE O/P NEW MOD 45 MIN: CPT | Performed by: STUDENT IN AN ORGANIZED HEALTH CARE EDUCATION/TRAINING PROGRAM

## 2022-12-13 PROCEDURE — G8400 PT W/DXA NO RESULTS DOC: HCPCS | Performed by: STUDENT IN AN ORGANIZED HEALTH CARE EDUCATION/TRAINING PROGRAM

## 2022-12-13 RX ORDER — LOSARTAN POTASSIUM 25 MG/1
25 TABLET ORAL DAILY
Qty: 90 TABLET | Refills: 0 | Status: SHIPPED | OUTPATIENT
Start: 2022-12-13

## 2022-12-13 RX ORDER — LOSARTAN POTASSIUM 25 MG/1
25 TABLET ORAL DAILY
COMMUNITY
Start: 2022-12-05 | End: 2022-12-13 | Stop reason: SDUPTHER

## 2022-12-13 NOTE — PROGRESS NOTES
1. \"Have you been to the ER, urgent care clinic since your last visit? Hospitalized since your last visit? \" No    2. \"Have you seen or consulted any other health care providers outside of the [address] [place] [address] since your last visit? \" Yes Reason for visit: Dizziness    3. For patients aged 39-70: Has the patient had a colonoscopy / FIT/ Cologuard? No      If the patient is female:    4. For patients aged 41-77: Has the patient had a mammogram within the past 2 years? Yes - no Care Gap present      5. For patients aged 21-65: Has the patient had a pap smear? NA - based on age or sex     Financial Resource Strain: Medium Risk    Difficulty of Paying Living Expenses: Somewhat hard    Resource packet given to patient.

## 2022-12-13 NOTE — PROGRESS NOTES
[facility] in [facility]   Internal Medicine  [address] [facility] [address], [address] [facility]  [address]  [place], [address] [facility]  [phone]      Primary Care Visit Note    Assessment/Plan:     Diagnoses and all orders for this visit:    1. Injury of right rotator cuff, initial encounter  -     REFERRAL TO ORTHOPEDICS    2. Primary hypertension  -  INCREASE  losartan (COZAAR) TO 25 mg tablet; Take 1 Tablet by mouth daily. Per patient half a tablet daily. 3. Chronic pain of left knee  -     REFERRAL TO ORTHOPEDICS    4. Screening due  -     DEXA BONE DENSITY STUDY AXIAL; Future    5. Left foot pain  -     REFERRAL TO PODIATRY    6. Routine physical examination  -     CBC WITH AUTOMATED DIFF; Future  -     METABOLIC PANEL, COMPREHENSIVE; Future  -     LIPID PANEL; Future  -     HEMOGLOBIN A1C WITH EAG; Future    7. Post-menopause  -     DEXA BONE DENSITY STUDY AXIAL; Future      HM:  She declines all vaccines today, counseled on the importance of them. Follow-up and Dispositions    Return in about 3 months (around 3/13/2023). Shruthi Ramey MD    CC:     Chief Complaint   Patient presents with    [facility]       HPI:     Josh Valdez is a 74 y.o. female who presents to [facility]. Patient is having muscle soreness and weakness localized to her RUE which she is concerned may be related to the losartan as it started at a similar time. - Patient works as  and frequently uses her right arm. Pt reports that she also has chronic L knee pain and L foot pain. She had previously seen a podiatrist and was told she had an issue with her arch. Patient has had colonoscopies in the past and is scheduled to have another colonoscopy next year. HTN:  - she has been taking 12.5 mg of losartan daily.       ROS:   Constitutional: negative for fevers, chills, anorexia and weight loss  Eyes:   negative for visual disturbance and irritation  ENT:   negative for tinnitus,sore throat,nasal congestion,ear pain,hoarseness  Respiratory:  negative for cough, hemoptysis, dyspnea,wheezing  CV:   negative for chest pain, palpitations, lower extremity edema  GI:   negative for nausea, vomiting, diarrhea, abdominal pain,melena  Genitourinary: negative for frequency, dysuria and hematuria  Musculoskel: negative for myalgias, arthralgias, back pain, muscle weakness, joint pain  Neurological:  negative for headaches, dizziness, focal weakness, numbness  Psychiatric:     Negative for depression or anxiety        Past Medical History: Active Ambulatory Problems     Diagnosis Date Noted    Ventral hernia 07/17/2013    Lipoma of back 07/17/2013     Resolved Ambulatory Problems     Diagnosis Date Noted    No Resolved Ambulatory Problems     Past Medical History:   Diagnosis Date    Ill-defined condition 8/7/15    Other ill-defined conditions(799.89)           Current Medications:     Current Outpatient Medications:     losartan (COZAAR) 25 mg tablet, Take 25 mg by mouth daily. Per patient half a tablet daily. , Disp: , Rfl:     ibuprofen (MOTRIN) 800 mg tablet, Take 1 Tab by mouth every eight (8) hours as needed for Pain., Disp: 21 Tab, Rfl: 0    ketorolac (TORADOL) 10 mg tablet, Take 1 Tab by mouth three (3) times daily as needed for Pain., Disp: 20 Tab, Rfl: 0    cyclobenzaprine (FLEXERIL) 10 mg tablet, Take 1 Tab by mouth three (3) times daily as needed for Muscle Spasm(s). , Disp: 21 Tab, Rfl: 0    mv-FA-dha-epa-fish-veronica-D3-gink 400 mcg/200 mg -240 mg cmpk, Take  by mouth., Disp: , Rfl:     Biotin 2,500 mcg cap, Take  by mouth. (Patient not taking: Reported on 12/13/2022), Disp: , Rfl:     clobetasol (TEMOVATE) 0.05 % topical cream, Apply  to affected area two (2) times a day., Disp: , Rfl:     desloratadine (CLARINEX) 5 mg tablet, Take 5 mg by mouth daily as needed for Allergies. , Disp: , Rfl:       Past Surgical History:     Past Surgical History:   Procedure Laterality Date ENDOSCOPY, COLON, DIAGNOSTIC          HX BREAST BIOPSY      4 benign breast surgeries-2 on each side    HX GYN  2007    partial hysterectomy. fibroid. HX LIPOMA RESECTION  8/14/15    excision of lipomas right flank and right supraumbilical region    HX ORTHOPAEDIC Bilateral     BONE REMOVAL FROM INNER ASPECT BOTH FEET    HX OTHER SURGICAL      CYST EXCISIONS FROM BIRTH CANAL    HX WISDOM TEETH EXTRACTION  2020         Family History:     Family History   Problem Relation Age of Onset    Diabetes Mother     Heart Disease Mother     Anesth Problems Neg Hx          Social History:     Social History     Socioeconomic History    Marital status:      Spouse name: Not on file    Number of children: Not on file    Years of education: Not on file    Highest education level: Not on file   Occupational History    Not on file   Tobacco Use    Smoking status: Former     Packs/day: 1.00     Years: 6.00     Pack years: 6.00     Types: Cigarettes     Quit date: 2002     Years since quittin.9    Smokeless tobacco: Never   Vaping Use    Vaping Use: Never used   Substance and Sexual Activity    Alcohol use:  Yes     Alcohol/week: 4.0 standard drinks     Types: 4 Shots of liquor per week     Comment: social    Drug use: No    Sexual activity: Yes   Other Topics Concern    Not on file   Social History Narrative    Not on file     Social Determinants of Health     Financial Resource Strain: Medium Risk    Difficulty of Paying Living Expenses: Somewhat hard   Food Insecurity: No Food Insecurity    Worried About Running Out of Food in the Last Year: Never true    Ran Out of Food in the Last Year: Never true   Transportation Needs: Not on file   Physical Activity: Not on file   Stress: Not on file   Social Connections: Not on file   Intimate Partner Violence: Not on file   Housing Stability: Not on file            Visit Vitals  BP (!) 156/82   Pulse 78   Temp 97.3 °F (36.3 °C) (Tympanic)   Resp 16   Ht 5' 6\" (1.676 m)   Wt 184 lb 12.8 oz (83.8 kg)   SpO2 99%   BMI 29.83 kg/m²       Physical Exam:   General - Well appearing female  HEENT - PERRL, TM no erythema/opacification, normal nasal turbinates, no oropharyngeal erythema or exudate, MMM  Neck - supple, no thyroidomegaly, no lymphadenopathy  Pulm - clear to auscultation bilaterally  Cardio - RRR, normal S1 S2, no murmur  Abd - soft, nontender, no masses, no HSM  Extrem - no edema, +2 distal pulses  Neuro-  Alert and oriented, No focal deficits  MSK - pt unable to actively flex or abduct R shoulder fully, limited to 100 degrees. No limit of passive flexion abduction. Empty can test positive.              Labs/Imaging:

## 2022-12-13 NOTE — Clinical Note
FYI-Offered patient enrollment in the [address] [place] [address] Remote Patient Monitoring (RPM) program for in home monitoring for HTN. Patient accepted RPM services.

## 2022-12-14 ENCOUNTER — LAB ONLY (OUTPATIENT)
Dept: INTERNAL MEDICINE CLINIC | Age: 67
End: 2022-12-14

## 2022-12-14 DIAGNOSIS — Z00.00 ROUTINE PHYSICAL EXAMINATION: ICD-10-CM

## 2022-12-14 NOTE — PROGRESS NOTES
Left message offering patient enrollment in the [address] [place] [address] Remote Patient Monitoring (RPM) program for in home monitoring for HTN. Patient acceptance is pending her returning call regarding RPM   services. Ambulatory Care Management Note    Date/Time:  12/15/2022 12:08 PM    This patient was received as a referral from  daily provider schedule . Ambulatory Care Manager outreached to patient today to offer care management services. Introduction to self and role of care manager provided. Patient accepted care management services at this time. Follow up call scheduled at this time. Patient has Ambulatory Care Manager's contact number for any questions or concerns. .Remote Patient Monitoring In Office Enrollment/Set Up Note  Date/Time:  12/15/2022 9:33 AM  Offered patient enrollment in the [address] [place] [address] Remote Patient Monitoring (RPM) program for in home monitoring for HTN. Patient accepted RPM services. Patient will be monitoring activity, blood pressure , weight, survey questions, and disease specific education modules  daily. ACM reviewed the information below with patient:     Emergency Contact: Grover Pedraza (Spouse) [phone] (Mobile)    [] RPM kit provided to patient during office encounter          [x] Determined BP cuff side.   -REGULAR                                                [x] Hours of ACM monitoring Monday-Friday [id number]                  [] Patient received all RPM equipment (tablet, scale, blood pressure device and cuff, and pulse oximeter)              [x] Instructed patient keep box for use when returning equipment                                                          [] Reviewed Patient Welcome Letter with patient                         [] Patient signed consent on tablet   [x] Assisted patient in setting up tablet and blue tooth devices             [x] Instructed patient to keep scale on flat surface                                                         [] Instructed patient to keep tablet plugged in at all times                         [] Reviewed tablet setting with patient   [x] Instructed how to contact IT support from tablet   [x] Provided Remote Patient Monitoring ACM contact information                  All questions answered at this time. Patient verbalized understanding of all information discussed. Patient has this Ambulatory Care Manager's contact information for any further questions, concerns, or needs. Remote Patient Monitoring Care Plan      Date/Time:  12/15/2022 9:33 AM    Patient is currently enrolled in the [facility] Patient Monitoring ([facility]) program for in home monitoring for HTN. Patient will be monitoring activity, blood pressure , weight, survey questions, and disease specific education modules  daily. Interventions: Reviewed metrics needed for daily management of chronic disease--HTN     Short Term Goal: Patient will engage in Remote Patient Monitoring each day to develop the skills necessary for self management. Long Term Goal: Patient will be able to complete self management skills daily for better disease management.      Care Team Responsibilities:   Alerts will be reviewed daily and addressed within 2-4 hours during operational hours (Monday -Friday 9 am-4 pm)  Alert response and intervention documented in patient medical record   Patient monitored over  days  Discharge from program based on Self-Management Readiness assessment

## 2022-12-15 LAB
ALBUMIN SERPL-MCNC: 4.2 G/DL (ref 3.5–5)
ALBUMIN/GLOB SERPL: 1.1 (ref 1.1–2.2)
ALP SERPL-CCNC: 91 U/L (ref 45–117)
ALT SERPL-CCNC: 21 U/L (ref 12–78)
ANION GAP SERPL CALC-SCNC: 4 MMOL/L (ref 5–15)
AST SERPL-CCNC: 14 U/L (ref 15–37)
BASOPHILS # BLD: 0 K/UL (ref 0–0.1)
BASOPHILS NFR BLD: 1 % (ref 0–1)
BILIRUB SERPL-MCNC: 0.7 MG/DL (ref 0.2–1)
BUN SERPL-MCNC: 15 MG/DL (ref 6–20)
BUN/CREAT SERPL: 18 (ref 12–20)
CALCIUM SERPL-MCNC: 9.4 MG/DL (ref 8.5–10.1)
CHLORIDE SERPL-SCNC: 106 MMOL/L (ref 97–108)
CHOLEST SERPL-MCNC: 193 MG/DL
CO2 SERPL-SCNC: 30 MMOL/L (ref 21–32)
CREAT SERPL-MCNC: 0.82 MG/DL (ref 0.55–1.02)
DIFFERENTIAL METHOD BLD: ABNORMAL
EOSINOPHIL # BLD: 0.4 K/UL (ref 0–0.4)
EOSINOPHIL NFR BLD: 6 % (ref 0–7)
ERYTHROCYTE [DISTWIDTH] IN BLOOD BY AUTOMATED COUNT: 13.2 % (ref 11.5–14.5)
EST. AVERAGE GLUCOSE BLD GHB EST-MCNC: 111 MG/DL
GLOBULIN SER CALC-MCNC: 3.7 G/DL (ref 2–4)
GLUCOSE SERPL-MCNC: 106 MG/DL (ref 65–100)
HBA1C MFR BLD: 5.5 % (ref 4–5.6)
HCT VFR BLD AUTO: 35 % (ref 35–47)
HDLC SERPL-MCNC: 52 MG/DL
HDLC SERPL: 3.7 (ref 0–5)
HGB BLD-MCNC: 11.1 G/DL (ref 11.5–16)
IMM GRANULOCYTES # BLD AUTO: 0 K/UL (ref 0–0.04)
IMM GRANULOCYTES NFR BLD AUTO: 0 % (ref 0–0.5)
LDLC SERPL CALC-MCNC: 116.4 MG/DL (ref 0–100)
LYMPHOCYTES # BLD: 3.4 K/UL (ref 0.8–3.5)
LYMPHOCYTES NFR BLD: 49 % (ref 12–49)
MCH RBC QN AUTO: 30.7 PG (ref 26–34)
MCHC RBC AUTO-ENTMCNC: 31.7 G/DL (ref 30–36.5)
MCV RBC AUTO: 97 FL (ref 80–99)
MONOCYTES # BLD: 0.4 K/UL (ref 0–1)
MONOCYTES NFR BLD: 6 % (ref 5–13)
NEUTS SEG # BLD: 2.6 K/UL (ref 1.8–8)
NEUTS SEG NFR BLD: 38 % (ref 32–75)
NRBC # BLD: 0 K/UL (ref 0–0.01)
NRBC BLD-RTO: 0 PER 100 WBC
PLATELET # BLD AUTO: 315 K/UL (ref 150–400)
PMV BLD AUTO: 9 FL (ref 8.9–12.9)
POTASSIUM SERPL-SCNC: 4.4 MMOL/L (ref 3.5–5.1)
PROT SERPL-MCNC: 7.9 G/DL (ref 6.4–8.2)
RBC # BLD AUTO: 3.61 M/UL (ref 3.8–5.2)
SODIUM SERPL-SCNC: 140 MMOL/L (ref 136–145)
TRIGL SERPL-MCNC: 123 MG/DL (ref ?–150)
VLDLC SERPL CALC-MCNC: 24.6 MG/DL
WBC # BLD AUTO: 6.9 K/UL (ref 3.6–11)

## 2022-12-16 ENCOUNTER — PATIENT OUTREACH (OUTPATIENT)
Dept: CASE MANAGEMENT | Age: 67
End: 2022-12-16

## 2022-12-16 NOTE — PROGRESS NOTES
LVM regarding Remote Patient Monitoring that you went over with Baljinder Maguire. Im calling to let you know that the kit has been ordered and it should be delivered to you in the 1-3 business days by [employer]. Someone does need to be present to sign for the kit when its delivered but they will make 3 attempts if your not available the first time. After you receive your kit an 888 number is going to call you. Its going to be [employer] who is the company we do the monitoring through. They are going to help you set the equipment up and show you how to use everything. If you have caller ID it may come up as an unknown number but they wont reach out to until after you receive your kit. If you would like to call them before hand their phone number is [phone]. Note has been added for delivery instructions for [employer] to call the patient the day it is being delivered so someone is home.

## 2022-12-19 ENCOUNTER — PATIENT OUTREACH (OUTPATIENT)
Dept: CASE MANAGEMENT | Age: 67
End: 2022-12-19

## 2022-12-19 ENCOUNTER — TELEPHONE (OUTPATIENT)
Dept: INTERNAL MEDICINE CLINIC | Age: 67
End: 2022-12-19

## 2022-12-19 NOTE — PROGRESS NOTES
Called, spoke to pt. Two pt identifiers confirmed. Patient informed lab results per. Anna Odom  See message below. Labs look good overall. Hemoglobin is a little low, indicating anemia but has been stable. We can discuss further at follow up visit in February.       Patient verbalized understanding of information discussed with no further questions at this time.      Ira Vang LPN

## 2022-12-19 NOTE — PROGRESS NOTES
Remote Patient Monitoring Note      Date/Time:  12/19/2022 2:15 PM    LPN reviewed patients reported daily Remote Patient Monitoring metrics. All reported metrics are within normal limits. Plan/Follow Up: Will continue to review, monitor and address alerts with follow up based on severity of symptoms and risk factors.

## 2022-12-20 ENCOUNTER — PATIENT OUTREACH (OUTPATIENT)
Dept: CASE MANAGEMENT | Age: 67
End: 2022-12-20

## 2022-12-20 ENCOUNTER — HOSPITAL ENCOUNTER (OUTPATIENT)
Dept: BONE DENSITY | Age: 67
Discharge: HOME | End: 2022-12-20
Attending: STUDENT IN AN ORGANIZED HEALTH CARE EDUCATION/TRAINING PROGRAM
Payer: MEDICARE

## 2022-12-20 DIAGNOSIS — M25.562 LEFT KNEE PAIN, UNSPECIFIED CHRONICITY: Primary | ICD-10-CM

## 2022-12-20 DIAGNOSIS — Z78.0 POST-MENOPAUSE: ICD-10-CM

## 2022-12-20 DIAGNOSIS — Z13.9 SCREENING DUE: ICD-10-CM

## 2022-12-20 DIAGNOSIS — M25.511 RIGHT SHOULDER PAIN, UNSPECIFIED CHRONICITY: ICD-10-CM

## 2022-12-20 PROCEDURE — 77080 DXA BONE DENSITY AXIAL: CPT

## 2022-12-20 NOTE — PROGRESS NOTES
Remote Patient Monitoring Note      Date/Time:  2022 8:38 AM    LPN contacted patient by telephone regarding red alert received for weight increase (3lbs one day). Verified patients name and  as identifiers. Background: patient scale reading in kilograms changed to pounds   Clinical Interventions: Reviewed and followed up on alerts and treatments-patient verbalized understanding     Plan/Follow Up: Will continue to review, monitor and address alerts with follow up based on severity of symptoms and risk factors.

## 2022-12-21 ENCOUNTER — HOSPITAL ENCOUNTER (OUTPATIENT)
Dept: GENERAL RADIOLOGY | Age: 67
Discharge: HOME | End: 2022-12-21
Payer: MEDICARE

## 2022-12-21 ENCOUNTER — OFFICE VISIT (OUTPATIENT)
Dept: ORTHOPEDIC SURGERY | Age: 67
End: 2022-12-21
Payer: MEDICARE

## 2022-12-21 ENCOUNTER — PATIENT OUTREACH (OUTPATIENT)
Dept: CASE MANAGEMENT | Age: 67
End: 2022-12-21

## 2022-12-21 VITALS
WEIGHT: 185 LBS | BODY MASS INDEX: 29.73 KG/M2 | OXYGEN SATURATION: 100 % | HEART RATE: 73 BPM | HEIGHT: 66 IN | DIASTOLIC BLOOD PRESSURE: 91 MMHG | TEMPERATURE: 97.2 F | SYSTOLIC BLOOD PRESSURE: 154 MMHG

## 2022-12-21 DIAGNOSIS — M17.12 UNILATERAL PRIMARY OSTEOARTHRITIS, LEFT KNEE: ICD-10-CM

## 2022-12-21 DIAGNOSIS — M25.511 RIGHT SHOULDER PAIN, UNSPECIFIED CHRONICITY: ICD-10-CM

## 2022-12-21 DIAGNOSIS — M25.562 LEFT KNEE PAIN, UNSPECIFIED CHRONICITY: ICD-10-CM

## 2022-12-21 DIAGNOSIS — M75.41 IMPINGEMENT SYNDROME OF RIGHT SHOULDER: Primary | ICD-10-CM

## 2022-12-21 PROCEDURE — 73564 X-RAY EXAM KNEE 4 OR MORE: CPT

## 2022-12-21 PROCEDURE — 73030 X-RAY EXAM OF SHOULDER: CPT

## 2022-12-21 RX ORDER — DICLOFENAC SODIUM 75 MG/1
75 TABLET, DELAYED RELEASE ORAL 2 TIMES DAILY
Qty: 60 TABLET | Refills: 0 | Status: SHIPPED | OUTPATIENT
Start: 2022-12-21

## 2022-12-21 NOTE — PROGRESS NOTES
Identified pt with two pt identifiers (name and ). Reviewed chart in preparation for visit and have obtained necessary documentation. Edgar Kinght is a 74 y.o. female  Chief Complaint   Patient presents with    Knee Pain    Shoulder Pain     LT Knee, RT Shoulder      Visit Vitals  BP (!) 154/91 (BP 1 Location: Right arm, BP Patient Position: Sitting, BP Cuff Size: Large adult)   Pulse 73   Temp 97.2 °F (36.2 °C) (Tympanic)   Ht 5' 6\" (1.676 m)   Wt 185 lb (83.9 kg)   SpO2 100%   BMI 29.86 kg/m²     1. Have you been to the ER, urgent care clinic since your last visit? Hospitalized since your last visit? No    2. Have you seen or consulted any other health care providers outside of the [address] [place] [address] since your last visit? Include any pap smears or colon screening.  No

## 2022-12-21 NOTE — LETTER
12/21/2022    Patient: Eileen Jacobs   YOB: 1955   Date of Visit: 12/21/2022     Leticia Zuñiga MD  [facility] [id number]  [address] [place]    Dear Leticia Zuñiga MD,      Thank you for referring Ms. Eileen Jacobs to [facility] for evaluation. My notes for this consultation are attached. If you have questions, please do not hesitate to call me. I look forward to following your patient along with you.       Sincerely,    Uma Morris, DO

## 2022-12-21 NOTE — PROGRESS NOTES
12/21/2022    Chief Complaint: Left knee pain, right shoulder pain    Assessment: Osteoarthritis left knee right shoulder impingement    Plan: This patient and I did discuss the many options in treating knee osteoarthritis. We did discuss that we could continue to seek out nonoperative modalities, such as: NSAIDs, oral and topical analgesics, knee injections, knee braces, physical therapy, stretching, strengthening, and weight loss strategies, activity modification, ambulatory assistive devices. The patient stated their understanding with this, and would like to proceed with nonsurgical management in the form of physical therapy, diclofenac. HPI: This is a 74 y.o. female who complains of left knee pain and right shoulder pain. The knee has hurt for a month, but has intermittent exacerbations. Her right shoulder has hurt for 6 months. onset was gradual.  The patient has had activity dependent pain for overall 6 months. The patient has tried activity modification, physical therapy exercises, injections have not been attempted. The pain is in the posterior lateral knee, lateral shoulder, it is severe in intensity. The patient feels unstable with the knee, fears falling, and has significant limitation with activities of daily living, recreation, and walks with no device. Past Medical History:   Diagnosis Date    Ill-defined condition 8/7/15    ALLERGIC REACTION CAUSING MANY DARK SPOTS MAINLY ON ARMS & LEGS, PT MEDICATING FOR ITCHING; DENSE SKIN-COLORED RASH ON UPPER BACK    Lipoma of back 7/17/2013    Other ill-defined conditions(799.89)     FIBROCYSTIC BREAST DISEASE; OTHER CYSTS       Past Surgical History:   Procedure Laterality Date    ENDOSCOPY, COLON, DIAGNOSTIC      2007    HX BREAST BIOPSY      4 benign breast surgeries-2 on each side    HX GYN  2007    partial hysterectomy. fibroid.     HX LIPOMA RESECTION  8/14/15    excision of lipomas right flank and right supraumbilical region    HX ORTHOPAEDIC Bilateral     BONE REMOVAL FROM INNER ASPECT BOTH FEET    HX OTHER SURGICAL      CYST EXCISIONS FROM BIRTH CANAL    HX WISDOM TEETH EXTRACTION  2020       Current Outpatient Medications on File Prior to Visit   Medication Sig Dispense Refill    losartan (COZAAR) 25 mg tablet Take 1 Tablet by mouth daily. Per patient half a tablet daily. 90 Tablet 0     No current facility-administered medications on file prior to visit. Allergies   Allergen Reactions    Other Food Other (comments)     POTATOES, BLUEBERRIES, CELERY, TOMATO PER ALLERGY TESTING (RECENT 8/7/15)    Iodine Hives    Other Medication Unknown (comments)     Seafood-nausea, diarrhea, pain    Sulfa (Sulfonamide Antibiotics) Swelling       Family History   Problem Relation Age of Onset    Diabetes Mother     Heart Disease Mother     Anesth Problems Neg Hx        Social History     Socioeconomic History    Marital status:    Tobacco Use    Smoking status: Former     Packs/day: 1.00     Years: 6.00     Pack years: 6.00     Types: Cigarettes     Quit date: 2002     Years since quittin.9    Smokeless tobacco: Never   Vaping Use    Vaping Use: Never used   Substance and Sexual Activity    Alcohol use:  Yes     Alcohol/week: 4.0 standard drinks     Types: 4 Shots of liquor per week     Comment: social    Drug use: No    Sexual activity: Yes     Social Determinants of Health     Financial Resource Strain: Medium Risk    Difficulty of Paying Living Expenses: Somewhat hard   Food Insecurity: No Food Insecurity    Worried About Running Out of Food in the Last Year: Never true    Ran Out of Food in the Last Year: Never true         Review of Systems:       General: Denies headache, lethargy, fever, weight loss  Ears/Nose/Throat: Denies ear discharge, drainage, nosebleeds, hoarse voice, dental problems  Cardiovascular: Denies chest pain, shortness of breath  Lungs: Denies chest pain, breathing problems, wheezing, pneumonia  Stomach: Denies stomach pain, heartburn, constipation, irritable bowel  Skin: Denies rash, sores, open wounds  Musculoskeletal: Admits to knee pain, right shoulder pain  Genitourinary: Denies dysuria, hematuria, polyuria  Gastrointestinal: Denies constipation, obstipation, diarrhea  Neurological: Denies changes in sight, smell, hearing, taste, seizures. Denies loss of consciousness. Psychiatric: Denies depression, sleep pattern changes, anxiety, change in personality  Endocrine: Denies mood swings, heat or cold intolerance  Hematologic/Lymphatic: Denies anemia, purpura, petechia  Allergic/Immunologic: Denies swelling of throat, pain or swelling at lymph nodes      Physical Examination:    Visit Vitals  BP (!) 154/91 (BP 1 Location: Right arm, BP Patient Position: Sitting, BP Cuff Size: Large adult)   Pulse 73   Temp 97.2 °F (36.2 °C) (Tympanic)   Ht 5' 6\" (1.676 m)   Wt 185 lb (83.9 kg)   SpO2 100%   BMI 29.86 kg/m²        General: AOX3, no apparent distress  Psychiatric: mood and affect appropriate  Lungs: breathing is symmetric and unlabored bilaterally  Heart: regular rate and rhythm  Abdomen: no guarding  Head: normocephalic, atraumatic  Skin: No significant abnormalities, good turgor  Sensation intact to light touch: L1-S1 dermatomes  Muscular exam: 5/5 strength in all major muscle groups unless noted in specialty exam.    Extremities:      Left upper extremity: Full active and passive range of motion without pain, deformity, no open wound, strength 5/5 in all major muscle groups. Right upper extremity: No gross deformity. There is global restriction to range of motion above 90 degrees of forward flexion or abduction. Otherwise, no restriction to passive range of motion of the elbow, wrist.  Positive impingement maneuver. Open can test is positive. Negative hornblower's maneuver. Belly press is negative for weakness, and internal rotation is to L 5. Neck range of motion is full and pain free.   Muscle bulk is appropriate without wasting. Sensation is intact to light touch in the C5-T1 dermatomes. Capillary refill is less than 2 seconds in the fingers. Strength testing is 5/5 at the major muscle groups of the shoulder, elbow, and wrist.        Right lower extremity: Full active and passive range of motion without pain, deformity, no open wound, strength 5/5 in all major muscle groups. Left lower extremity: Valgus deformity is noted. Range of motion of the knee is 0-1 10. Ligamentous testing of the knee indicates stability of the the MCL, LCL, PCL, and ACL. Lachman's, anterior and posterior drawer tests are specifically negative. Medial joint line tenderness to palpation is noted. Popliteal area is unremarkable. 1+  for effusion. No patellar crepitus. Patella tracks centrally. Pivot shift is negative. Strength testing is indicative of 5/5 strength at hip flexion, extension, knee flexion and extension, tibialis anterior, EHL, and FHL. Sensation is intact to light touch in the L1-S1 dermatomes. Capillary refill is less than 2 seconds in the toes. Diagnostics:    Pertinent Diagnostics:  Xrays are ordered and reviewed by myself of the left knee, right shoulder. X-rays of the left knee, they indicate severe lateral compartment osteoarthritis with valgus deformity osteoarthritis of the knee joint, no significant other findings, no other osseus abnormalities, fractures, or dislocations. Moderate to severe degenerative changes are noted at the [facility] joint of the right shoulder        Procedures: None today    Ms. Jarred Guillermo has a reminder for a \"due or due soon\" health maintenance. I have asked that she contact her primary care provider for follow-up on this health maintenance.

## 2022-12-22 ENCOUNTER — PATIENT OUTREACH (OUTPATIENT)
Dept: CASE MANAGEMENT | Age: 67
End: 2022-12-22

## 2022-12-22 NOTE — PROGRESS NOTES
Remote Patient Monitoring Note      Date/Time:  2022 8:41 AM    LPN contacted patient by telephone regarding red alert received for pulse ox reading (86%). Verified patients name and  as identifiers. Background: HTN,   Clinical Interventions: Reviewed and followed up on alerts and treatments-Patient rechecked pulse ox level 99% on room air    Plan/Follow Up: Will continue to review, monitor and address alerts with follow up based on severity of symptoms and risk factors.

## 2022-12-23 ENCOUNTER — PATIENT OUTREACH (OUTPATIENT)
Dept: CASE MANAGEMENT | Age: 67
End: 2022-12-23

## 2022-12-23 NOTE — PROGRESS NOTES
Remote Patient Monitoring Note      Date/Time:  12/23/2022 11:21 AM    LPN reviewed patients reported daily Remote Patient Monitoring metrics. All reported metrics are within normal limits. Plan/Follow Up: Will continue to review, monitor and address alerts with follow up based on severity of symptoms and risk factors.

## 2022-12-27 ENCOUNTER — PATIENT OUTREACH (OUTPATIENT)
Dept: CASE MANAGEMENT | Age: 67
End: 2022-12-27

## 2022-12-27 NOTE — PROGRESS NOTES
Remote Patient Monitoring Note      Date/Time:  12/27/2022 12:37 PM    LPN reviewed patients reported daily Remote Patient Monitoring metrics. All reported metrics are within normal limits. Plan/Follow Up: Will continue to review, monitor and address alerts with follow up based on severity of symptoms and risk factors.

## 2022-12-28 ENCOUNTER — PATIENT OUTREACH (OUTPATIENT)
Dept: CASE MANAGEMENT | Age: 67
End: 2022-12-28

## 2022-12-28 NOTE — PROGRESS NOTES
Called, spoke to pt. Two pt identifiers confirmed. Patient informed lab results per. Ulice Stable  See message below. Labs look good overall. Hemoglobin is a little low, indicating anemia but has been stable. We can discuss further at follow up visit in February. Patient verbalized understanding of information discussed with no further questions at this time.      Joe Mckeon LPN

## 2022-12-28 NOTE — PROGRESS NOTES
Remote Patient Monitoring Note      Date/Time:  12/28/2022 8:00 AM    LPN reviewed patients reported daily Remote Patient Monitoring metrics. All reported metrics are within normal limits. Current Patient Metrics ---- Activity: - mins Blood Pressure: 153/88, 88bpm Pulseox: 98%, 81bpm Survey: - Weight: 181.4lbs  Plan/Follow Up: Will continue to review, monitor and address alerts with follow up based on severity of symptoms and risk factors.

## 2022-12-28 NOTE — PROGRESS NOTES
Called, spoke to pt. Two pt identifiers confirmed. Patient informed lab results per. Yumiko Spring  See message below. Labs look good overall. Hemoglobin is a little low, indicating anemia but has been stable. We can discuss further at follow up visit in February. Patient verbalized understanding of information discussed with no further questions at this time.      Batsheav Moulton LPN

## 2022-12-29 ENCOUNTER — PATIENT OUTREACH (OUTPATIENT)
Dept: CASE MANAGEMENT | Age: 67
End: 2022-12-29

## 2022-12-29 NOTE — PROGRESS NOTES
Remote Patient Monitoring Note      Date/Time:  12/29/2022 3:50 PM    LPN reviewed patients reported daily Remote Patient Monitoring metrics. Weight metrics are within normal limits no other metrics reported today. Plan/Follow Up: Will continue to review, monitor and address alerts with follow up based on severity of symptoms and risk factors.

## 2022-12-30 ENCOUNTER — PATIENT OUTREACH (OUTPATIENT)
Dept: CASE MANAGEMENT | Age: 67
End: 2022-12-30

## 2022-12-30 NOTE — PROGRESS NOTES
Remote Patient Monitoring Note      Date/Time:  12/30/2022 11:08 AM    LPN reviewed patients reported daily Remote Patient Monitoring metrics. All reported metrics are within normal limits. Current Patient Metrics ---- Activity: - mins Blood Pressure: 156/90, 102bpm Pulseox: -%, -bpm Survey: - Weight: 181.0lbs   Plan/Follow Up: Will continue to review, monitor and address alerts with follow up based on severity of symptoms and risk factors.

## 2023-01-03 ENCOUNTER — HOSPITAL ENCOUNTER (OUTPATIENT)
Dept: PHYSICAL THERAPY | Age: 68
Discharge: HOME | End: 2023-01-03
Payer: MEDICARE

## 2023-01-03 ENCOUNTER — CLINICAL SUPPORT (OUTPATIENT)
Dept: INTERNAL MEDICINE CLINIC | Age: 68
End: 2023-01-03
Payer: MEDICARE

## 2023-01-03 ENCOUNTER — PATIENT OUTREACH (OUTPATIENT)
Dept: CASE MANAGEMENT | Age: 68
End: 2023-01-03

## 2023-01-03 VITALS — SYSTOLIC BLOOD PRESSURE: 132 MMHG | DIASTOLIC BLOOD PRESSURE: 80 MMHG

## 2023-01-03 DIAGNOSIS — I10 ESSENTIAL HYPERTENSION: Primary | ICD-10-CM

## 2023-01-03 PROCEDURE — 97110 THERAPEUTIC EXERCISES: CPT | Performed by: PHYSICAL THERAPIST

## 2023-01-03 PROCEDURE — 97162 PT EVAL MOD COMPLEX 30 MIN: CPT | Performed by: PHYSICAL THERAPIST

## 2023-01-03 PROCEDURE — 99211 OFF/OP EST MAY X REQ PHY/QHP: CPT | Performed by: STUDENT IN AN ORGANIZED HEALTH CARE EDUCATION/TRAINING PROGRAM

## 2023-01-03 NOTE — PROGRESS NOTES
PT INITIAL EVALUATION NOTE - [facility] 2-15    Patient Name: Lorena Late  Date:1/3/2023  : 1955  [x]  Patient  Verified  Payor: Faustino Seeds / Plan: [place] MEDICARE PART A & B / Product Type: Medicare /    In time: 8285  Out time: 100  Total Treatment Time (min): 44  Total Timed Codes (min): 10  1:1 Treatment Time ( [address] only): 10   Visit #: 1     Treatment Area: Pain in left knee [M25.562]  Pain in right shoulder [M25.511]    SUBJECTIVE  Pain Level (0-10 scale): Any medication changes, allergies to medications, adverse drug reactions, diagnosis change, or new procedure performed?: [] No    [x] Yes (see summary sheet for update)  Subjective:    Pt reports that she started with pain over the last few years. 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 She has noticed an increase in discomfort but admits that her pain is activity dependent. She says that the shoulder worsened about 6 months ago. She has been crawling up the wall to improve ROM which has. She has been prescribed a dose pack and an anti-inflammatory but hasn't taken any medications to date. She does not want to take any meds. She does complain of reaching into cabinets, difficulty stooping/squatting. She used to walk about 4 miles 3-4 days a week. She has gotten away from this because of her knee pain. She would like to get back to walking in the future. She has been using a stationary bike recently instead.   She also likes the elliptical.         OBJECTIVE/EXAMINATION  Posture:  rounded shoulders forward head  Other Observations:NA  Gait and Functional Mobility:  antalgic, hip drop right > left  Palpation: tenderness along the bicep, Upper trap, acromion, left greater trochanter and piriformis    Joint Mobility Assessment: decreased right GH posterior and inferior glides    Flexibility:       LOWER QUARTER   MUSCLE STRENGTH  KEY       R  L  0 - No Contraction  Knee ext  4  3  1 - Trace            flex  4  4  2 - Poor   Hip ext   NT  NT  3 - Fair          flex   3  2  4 - Good         abd  3  2  5 - Normal         add  NT  NT              ER  3  2      Ankle DF  3  3                 Neurological: Reflexes / Sensations: grossly intact  Special Tests: Trendelenberg: +    Stork: NT      Shaheed: NT     Nina: NT                 H.S. SLR: NT    Piriformis Ext: +      Long Sit: NT     Hip Scour: -      Knee Varus/Valgus Stress: NT  Knee Lachmans: NT      Thessaly Test: NT      Other: NT     Shoulder PROM is WFL with pain at end range.         F    UPPER QUARTER   MUSCLE STRENGTH  KEY       R  L  0 - No Contraction   Flexion  3  3  1 - Trace    Extension NT  NT  2 - Poor    Abduction 3  3  3 - Fair     IR  4  4  4 - Good    ER  3  3  5 - Normal       Special Tests: Neer Impingement: NT  Lorelei-Turner: +      Scapular Reposition: NT  Shoulder Abduction: +     Crank: NT    Load and Shift: NT    Racine: NT    Apprehension: -    Relocation : +   Speed's: +    Yergason: NT    AC Compression: NT    AC Crossover: +     10 min Therapeutic Exercise:  [x] See flow sheet :   Rationale: increase ROM, increase strength, and improve coordination to improve the patients ability to complete all activity           With   [x] TE   [] TA   [] Neuro   [] SC   [] other: Patient Education: [x] Review HEP    [x] Progressed/Changed HEP based on:   [x] positioning   [x] body mechanics   [] transfers   [] heat/ice application    [] other:      Other Objective/Functional Measures: FOTO Functional Measure: knee 49/100, shoulder 56/100                         Pain Level (0-10 scale) post treatment: 4    ASSESSMENT/Changes in Function:     [x]  See Plan of Kristy, PT 1/3/2023

## 2023-01-03 NOTE — PROGRESS NOTES
Remote Patient Monitoring Note      Date/Time:  1/3/2023 2:27 PM    LPN reviewed patients reported daily Remote Patient Monitoring metrics. All reported metrics are within normal limits. Plan/Follow Up: Will continue to review, monitor and address alerts with follow up based on severity of symptoms and risk factors.

## 2023-01-04 ENCOUNTER — PATIENT OUTREACH (OUTPATIENT)
Dept: CASE MANAGEMENT | Age: 68
End: 2023-01-04

## 2023-01-04 NOTE — PROGRESS NOTES
Remote Patient Monitoring Note      Date/Time:  1/4/2023 12:04 PM    LPN reviewed patients reported daily Remote Patient Monitoring metrics. All reported metrics are within normal limits. Plan/Follow Up: Will continue to review, monitor and address alerts with follow up based on severity of symptoms and risk factors.

## 2023-01-05 ENCOUNTER — PATIENT OUTREACH (OUTPATIENT)
Dept: CASE MANAGEMENT | Age: 68
End: 2023-01-05

## 2023-01-05 NOTE — PROGRESS NOTES
Remote Patient Monitoring Note      Date/Time:  1/5/2023 7:39 AM    LPN reviewed patients reported daily Remote Patient Monitoring metrics. All reported metrics are within normal limits. Plan/Follow Up: Will continue to review, monitor and address alerts with follow up based on severity of symptoms and risk factors.

## 2023-01-06 ENCOUNTER — PATIENT OUTREACH (OUTPATIENT)
Dept: CASE MANAGEMENT | Age: 68
End: 2023-01-06

## 2023-01-06 ENCOUNTER — HOSPITAL ENCOUNTER (OUTPATIENT)
Dept: PHYSICAL THERAPY | Age: 68
Discharge: HOME | End: 2023-01-06
Payer: MEDICARE

## 2023-01-06 PROCEDURE — 97110 THERAPEUTIC EXERCISES: CPT | Performed by: PHYSICAL THERAPIST

## 2023-01-06 PROCEDURE — 97140 MANUAL THERAPY 1/> REGIONS: CPT | Performed by: PHYSICAL THERAPIST

## 2023-01-06 NOTE — PROGRESS NOTES
PT DAILY TREATMENT NOTE - [facility] -15    Patient Name: Dulce Maria Orellana  Date:2023  : 1955  [x]  Patient  Verified  Payor: [place] MEDICARE / Plan: [place] MEDICARE PART A & B / Product Type: Medicare /    In time: 178  Out time: 957  Total Treatment Time (min): 49  Total Timed Codes (min): 49  1:1 Treatment Time ( only): 52   Visit #: 52 2    Treatment Area: Pain in left knee [M25.562]  Pain in right shoulder [M25.511]    SUBJECTIVE  Pain Level (0-10 scale): 3  Any medication changes, allergies to medications, adverse drug reactions, diagnosis change, or new procedure performed?: [x] No    [] Yes (see summary sheet for update)  Subjective functional status/changes:   [] No changes reported  Pt is without new reports    OBJECTIVE      37 min Therapeutic Exercise:  [x] See flow sheet :   Rationale: increase ROM, increase strength, and improve coordination to improve the patients ability to complete all activity      12 min Manual Therapy: PROM with inf and post [address] mobs    Rationale: decrease pain, increase ROM, increase tissue extensibility, decrease trigger points, and increase postural awareness to improve the patients ability to complete all activity    With   [x] TE   [] TA   [] Neuro   [] SC   [] other: Patient Education: [x] Review HEP    [x] Progressed/Changed HEP based on:   [x] positioning   [x] body mechanics   [] transfers   [] heat/ice application    [] other:      Other Objective/Functional Measures: NA     Pain Level (0-10 scale) post treatment: 2    ASSESSMENT/Changes in Function:   Pt was able to complete all activity well. She had improved shoulder ROM following manual and responded particular well to ER with posterior glides. She had improved gluteal activation following there-ex and felt that her gait was less antalgic, but said that she is usually worse as the day goes on. Will continue to progress as able.   Patient will continue to benefit from skilled PT services to modify and progress therapeutic interventions, address functional mobility deficits, address ROM deficits, address strength deficits, analyze and address soft tissue restrictions, analyze and cue movement patterns, analyze and modify body mechanics/ergonomics, assess and modify postural abnormalities, and address imbalance/dizziness to attain remaining goals.      []  See Plan of Care  []  See progress note/recertification  []  See Discharge Summary         Progress towards goals / Updated goals:  Progressing toward goals    PLAN  [x]  Upgrade activities as tolerated     [x]  Continue plan of care  [x]  Update interventions per flow sheet       []  Discharge due to:_  []  Other:_      Manny Cotter, PT 1/6/2023

## 2023-01-06 NOTE — PROGRESS NOTES
[facility]. Physical Therapy  [address] [place]  Phone: [phone] Fax: [phone]     Plan of Care/Statement of Necessity for Physical Therapy Services  2-15    Patient name: Adiel Woodson  : 1955  Provider#: [id number]  Referral source: Gabriel Nettles DO      Medical/Treatment Diagnosis: Pain in left knee [M25.562]  Pain in right shoulder [M25.511]     Prior Hospitalization: see medical history     Comorbidities:  Allergies, Arthritis, High Blood Pressure  Prior Level of Function: 20 min at least 3 times a week  Medications: Verified on Patient Summary List  Start of Care: 1/3/22      Onset Date: chronic   The Plan of Care and following information is based on the information from the initial evaluation. Assessment/ key information: Pt is a 75 yo female who is in today to begin therapy for right shoulder and left hip/knee pain. She reports that her pain started a few years ago. 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 Prior to that she reports that she had increased the amount of time that she was walking or standing during her work day. She has since decreased her walking routine from 4 miles 3-4 days each week to a mile intermittently. She reports that her shoulder pain started about 6 months ago but is unable to states a reason for this. She complains that it has become more difficult to reach into cabinets Her Objective findings include: decreased right should AROM and decreased strength in all directions. Lower extremity strength is limited throughout with greater limitations in hip flexion, hip abd, hip ER, and knee extension. Pt is a good candidate for PT and will benefit from skilled services to address these deficits and work toward the goals listed below.              Evaluation Complexity History MEDIUM  Complexity : 1-2 comorbidities / personal factors will impact the outcome/ POC ; Examination LOW Complexity : 1-2 Standardized tests and measures addressing body structure, function, activity limitation and / or participation in recreation  ;Presentation MEDIUM Complexity : Evolving with changing characteristics  ; Clinical Decision Making MEDIUM Complexity : FOTO score of 26-74  Overall Complexity Rating: LOW     Problem List: pain affecting function, decrease ROM, decrease strength, impaired gait/ balance, decrease ADL/ functional abilitiies, decrease activity tolerance, decrease flexibility/ joint mobility, and decrease transfer abilities   Treatment Plan may include any combination of the following: Therapeutic exercise, Neuromuscular reeducation, Manual therapy, Therapeutic activity, Self care/home management, Electric stim unattended , Vasopneumatic device, Gait training, Ultrasound, Mechanical traction, Electric stim attended, Needle insertion w/o injection (1 or 2 muscles), and Needle insertion w/o injection (3+ muscles)  Patient / Family readiness to learn indicated by: asking questions, trying to perform skills, and interest  Persons(s) to be included in education: patient (P)  Barriers to Learning/Limitations: None  Patient Goal (s): way to lessen the pain  Patient Self Reported Health Status: good  Rehabilitation Potential: good    Short Term Goals: To be accomplished in 8-10 treatments:   Pt will be consistent and demonstrate I with HEP  Pt will complain of pain 1-2/10 with all activity  Pt will demonstrate improved ROM to complete all ADL's and household chores more efficiently  Pt will be able to maintain positions for 30-45 min without increased symptoms    Long Term Goals:  To be accomplished in 15-20 treatments:   Pt will complain of pain 0-1/10 with all activity  Pt will increase strength to stabilize the core and shoulder to complete all activity through full ROM without increased symptoms  Pt will increase FOTO score by 9 points to meet MDC and demonstrate improved function with all activity  Pt will return to exercise routine and get back into her walking program without increased symptoms  Pt will use proper form and posture to complete all work tasks without increased symptoms 100% of the time    Frequency / Duration: Patient to be seen 2 times per week for 20 treatments. Patient/ Caregiver education and instruction: self care, activity modification, and exercises    [x]  Plan of care has been reviewed with PTA        Certification Period: 1/3/23-4/3/23  Sheila Borjas, PT 1/3/22    ________________________________________________________________________    I certify that the above Therapy Services are being furnished while the patient is under my care. I agree with the treatment plan and certify that this therapy is necessary.     [de-identified] Signature:____________________  Date:____________Time: _________         Rex Rowland DO

## 2023-01-06 NOTE — PROGRESS NOTES
Remote Patient Monitoring Note      Date/Time:  1/6/2023 12:22 PM    LPN reviewed patients reported daily Remote Patient Monitoring metrics. All reported metrics are within normal limits. Plan/Follow Up: Will continue to review, monitor and address alerts with follow up based on severity of symptoms and risk factors.

## 2023-01-09 ENCOUNTER — PATIENT OUTREACH (OUTPATIENT)
Dept: CASE MANAGEMENT | Age: 68
End: 2023-01-09

## 2023-01-10 ENCOUNTER — PATIENT OUTREACH (OUTPATIENT)
Dept: CASE MANAGEMENT | Age: 68
End: 2023-01-10

## 2023-01-10 NOTE — PROGRESS NOTES
Remote Patient Monitoring Note      Date/Time:  1/10/2023 8:47 AM    LPN reviewed patients reported daily Remote Patient Monitoring metrics. All reported metrics are within normal limits. Current Patient Metrics ---- Activity: - mins Blood Pressure: 136/79, 83bpm Pulseox: 99%, 95bpm Survey: - Weight: 179.6lbs  Plan/Follow Up: Will continue to review, monitor and address alerts with follow up based on severity of symptoms and risk factors.

## 2023-01-11 ENCOUNTER — HOSPITAL ENCOUNTER (OUTPATIENT)
Dept: PHYSICAL THERAPY | Age: 68
Discharge: HOME | End: 2023-01-11
Payer: MEDICARE

## 2023-01-11 ENCOUNTER — PATIENT OUTREACH (OUTPATIENT)
Dept: CASE MANAGEMENT | Age: 68
End: 2023-01-11

## 2023-01-11 PROCEDURE — 97110 THERAPEUTIC EXERCISES: CPT

## 2023-01-11 PROCEDURE — 97140 MANUAL THERAPY 1/> REGIONS: CPT

## 2023-01-11 NOTE — PROGRESS NOTES
Remote Patient Monitoring Note      Date/Time:  1/11/2023 8:18 AM    LPN reviewed patients reported daily Remote Patient Monitoring metrics. All reported metrics are within normal limits. Plan/Follow Up: Will continue to review, monitor and address alerts with follow up based on severity of symptoms and risk factors.

## 2023-01-11 NOTE — PROGRESS NOTES
PT DAILY TREATMENT NOTE - [facility] 2-15    Patient Name: Eileen Jacobs  Date:2023  : 1955  [x]  Patient  Verified  Payor: Marylin Khan / Plan: [place] MEDICARE PART A & B / Product Type: Medicare /    In time: 9325 Out time: 104  Total Treatment Time (min): 54  Total Timed Codes (min): 54  1:1 Treatment Time ( only): 40  Visit #: 3    Treatment Area: Pain in left knee [M25.562]  Pain in right shoulder [M25.511]    SUBJECTIVE  Pain Level (0-10 scale): 3  Any medication changes, allergies to medications, adverse drug reactions, diagnosis change, or new procedure performed?: [x] No    [] Yes (see summary sheet for update)  Subjective functional status/changes:   [] No changes reported  Patient noted they continue to note increased soreness and tightness in the R shoulder. Also noted they have been walking a lot more at work recently (6000+ steps/day) and they have been experiencing a bit more pain in their R knee/foot since. Also notes they have not been doing their exercises \"as often as I should,\" since previous treatment session.       OBJECTIVE      44 min Therapeutic Exercise:  [x] See flow sheet :   Rationale: increase ROM, increase strength, and improve coordination to improve the patients ability to complete all activity      10 min Manual Therapy: PROM with inf and post [address] mobs   MFR to UT/LS/pec/biceps musculature    Rationale: decrease pain, increase ROM, increase tissue extensibility, decrease trigger points, and increase postural awareness to improve the patients ability to complete all activity    With   [x] TE   [] TA   [] Neuro   [] SC   [] other: Patient Education: [x] Review HEP    [x] Progressed/Changed HEP based on:   [x] positioning   [x] body mechanics   [x] transfers   [] heat/ice application    [] other:      Other Objective/Functional Measures: NA     Pain Level (0-10 scale) post treatment: 3/10    ASSESSMENT/Changes in Function:   Patient tolerated treatment session well, able to perform new exercises and progressions without increasing symptoms post treatment session. Patient demonstrated decreased eccentric control and UE reliance during sit to stands today, able to improve following verbal cuing. Patient noted increased amounts of soreness/fatigue following treatment session today. Continue to progress as tolerated. Patient will continue to benefit from skilled PT services to modify and progress therapeutic interventions, address functional mobility deficits, address ROM deficits, address strength deficits, analyze and address soft tissue restrictions, analyze and cue movement patterns, analyze and modify body mechanics/ergonomics, assess and modify postural abnormalities, and address imbalance/dizziness to attain remaining goals.      [x]  See Plan of Care  []  See progress note/recertification  []  See Discharge Summary         Progress towards goals / Updated goals:  Progressing toward goals    PLAN  [x]  Upgrade activities as tolerated     [x]  Continue plan of care  [x]  Update interventions per flow sheet       []  Discharge due to:_  []  Other:_      Lester Núñez, PTA 1/11/2023

## 2023-01-11 NOTE — PROGRESS NOTES
Remote Patient Monitoring Note      Date/Time:  1/11/2023 9:33 AM    LPN reviewed patients reported daily Remote Patient Monitoring metrics. All reported metrics are within normal limits. Current Patient Metrics ---- Activity: - mins Blood Pressure: 138/82, 67bpm Pulseox: 95%, 75bpm Survey: - Weight: 181.0lbs   Plan/Follow Up: Will continue to review, monitor and address alerts with follow up based on severity of symptoms and risk factors.

## 2023-01-12 ENCOUNTER — PATIENT OUTREACH (OUTPATIENT)
Dept: CASE MANAGEMENT | Age: 68
End: 2023-01-12

## 2023-01-12 NOTE — PROGRESS NOTES
Remote Patient Monitoring Note      Date/Time:  1/12/2023 8:48 AM    LPN reviewed patients reported daily Remote Patient Monitoring metrics. All reported metrics are within normal limits. Current Patient Metrics ---- Activity: - mins Blood Pressure: 127/88, 74bpm Pulseox: 98%, 81bpm Survey: - Weight: 181.0lbs  Plan/Follow Up: Will continue to review, monitor and address alerts with follow up based on severity of symptoms and risk factors.

## 2023-01-13 ENCOUNTER — PATIENT OUTREACH (OUTPATIENT)
Dept: CASE MANAGEMENT | Age: 68
End: 2023-01-13

## 2023-01-13 ENCOUNTER — HOSPITAL ENCOUNTER (OUTPATIENT)
Dept: PHYSICAL THERAPY | Age: 68
Discharge: HOME | End: 2023-01-13
Payer: MEDICARE

## 2023-01-13 PROCEDURE — 97110 THERAPEUTIC EXERCISES: CPT

## 2023-01-13 PROCEDURE — 97140 MANUAL THERAPY 1/> REGIONS: CPT

## 2023-01-13 NOTE — PROGRESS NOTES
PT DAILY TREATMENT NOTE - [facility] 2-15    Patient Name: Chrissy Harmon  Date:2023  : 1955  [x]  Patient  Verified  Payor: Jessica Groves / Plan: [place] MEDICARE PART A & B / Product Type: Medicare /    In time: 484 Out time: 900  Total Treatment Time (min): 57  Total Timed Codes (min): 57  1:1 Treatment Time ([facility] only): 36  Visit #: 4    Treatment Area: Pain in left knee [M25.562]  Pain in right shoulder [M25.511]    SUBJECTIVE  Pain Level (0-10 scale): 1/10  Any medication changes, allergies to medications, adverse drug reactions, diagnosis change, or new procedure performed?: [x] No    [] Yes (see summary sheet for update)  Subjective functional status/changes:   [] No changes reported  Patient noted they felt an increased amount soreness following previous treatment session, but noted they had a decrease in pain since. Also noted they feel things are tighter in the mornings and tend to loosen up as they go. Continues to note improvement with their shoulder ROM since previous treatment session.      OBJECTIVE      47 min Therapeutic Exercise:  [x] See flow sheet :   Rationale: increase ROM, increase strength, and improve coordination to improve the patients ability to complete all activity      10 min Manual Therapy: PROM with inf and post [address] mobs   MFR to UT/LS/pec/biceps musculature    Rationale: decrease pain, increase ROM, increase tissue extensibility, decrease trigger points, and increase postural awareness to improve the patients ability to complete all activity    With   [x] TE   [] TA   [] Neuro   [] SC   [] other: Patient Education: [x] Review HEP    [] Progressed/Changed HEP based on:   [] positioning   [] body mechanics   [] transfers   [] heat/ice application    [] other:      Other Objective/Functional Measures: NA     Pain Level (0-10 scale) post treatment: 0/10    ASSESSMENT/Changes in Function:   Patient tolerated treatment session well, able to perform new exercises and progressions while decreasing symptoms post treatment session. Continues to note increased ROM with R shoulder, notes pain with flexion around 90 degrees. Patient noted increased amounts of soreness/fatigue following treatment session today. Continue to progress as tolerated. Patient will continue to benefit from skilled PT services to modify and progress therapeutic interventions, address functional mobility deficits, address ROM deficits, address strength deficits, analyze and address soft tissue restrictions, analyze and cue movement patterns, analyze and modify body mechanics/ergonomics, assess and modify postural abnormalities, and address imbalance/dizziness to attain remaining goals.      [x]  See Plan of Care  []  See progress note/recertification  []  See Discharge Summary         Progress towards goals / Updated goals:  Progressing toward goals    PLAN  [x]  Upgrade activities as tolerated     [x]  Continue plan of care  [x]  Update interventions per flow sheet       []  Discharge due to:_  []  Other:_      Radhames Amato PTA 1/13/2023

## 2023-01-13 NOTE — PROGRESS NOTES
Remote Patient Monitoring Note      Date/Time:  1/13/2023 8:25 AM    LPN reviewed patients reported daily Remote Patient Monitoring metrics. All reported metrics are within normal limits. Current Patient Metrics ---- Activity: - mins Blood Pressure: 151/81, 82bpm Pulseox: 98%, 81bpm Survey: - Weight: 181.2lbs  Plan/Follow Up: Will continue to review, monitor and address alerts with follow up based on severity of symptoms and risk factors.

## 2023-01-13 NOTE — PROGRESS NOTES
Remote Patient Monitoring Note      Date/Time:  1/13/2023 10:24 AM    LPN reviewed patients reported daily Remote Patient Monitoring metrics. All reported metrics are within normal limits. Current Patient Metrics ---- Activity: - mins Blood Pressure: 151/81, 82bpm Pulseox: 98%, 81bpm Survey: - Weight: 181.2lbs  Plan/Follow Up: Will continue to review, monitor and address alerts with follow up based on severity of symptoms and risk factors.

## 2023-01-16 ENCOUNTER — PATIENT OUTREACH (OUTPATIENT)
Dept: CASE MANAGEMENT | Age: 68
End: 2023-01-16

## 2023-01-16 NOTE — PROGRESS NOTES
Remote Patient Monitoring Note      Date/Time:  1/16/2023 8:47 AM    LPN reviewed patients reported daily Remote Patient Monitoring metrics. All reported metrics are within normal limits. Current Patient Metrics ---- Activity: - mins Blood Pressure: 135/81, 76bpm Pulseox: 98%, 84bpm Survey: - Weight: 180.0lbs     Plan/Follow Up: Will continue to review, monitor and address alerts with follow up based on severity of symptoms and risk factors.

## 2023-01-17 ENCOUNTER — PATIENT OUTREACH (OUTPATIENT)
Dept: CASE MANAGEMENT | Age: 68
End: 2023-01-17

## 2023-01-17 ENCOUNTER — HOSPITAL ENCOUNTER (OUTPATIENT)
Dept: PHYSICAL THERAPY | Age: 68
Discharge: HOME | End: 2023-01-17
Payer: MEDICARE

## 2023-01-17 PROCEDURE — 97110 THERAPEUTIC EXERCISES: CPT

## 2023-01-17 PROCEDURE — 97140 MANUAL THERAPY 1/> REGIONS: CPT

## 2023-01-17 NOTE — PROGRESS NOTES
PT DAILY TREATMENT NOTE - [facility] 2-15    Patient Name: Waqas Dunn  Date:2023  : 1955  [x]  Patient  Verified  Payor: Ronnell Stephenson / Plan: [place] / Product Type: Medicare /    In time: 80 Out time: 901  Total Treatment Time (min): 52  Total Timed Codes (min): 52  1:1 Treatment Time (MC only): 28  Visit #: 5    Treatment Area: Pain in left knee [M25.562]  Pain in right shoulder [M25.511]    SUBJECTIVE  Pain Level (0-10 scale): 4/10  Any medication changes, allergies to medications, adverse drug reactions, diagnosis change, or new procedure performed?: [x] No    [] Yes (see summary sheet for update)  Subjective functional status/changes:   [] No changes reported  Patient noted they are feeling a bit increased amounts of pain today in their knee and shoulder, due to the rain they believe. Noted some increased soreness over the weekend following previous treatment session, noted it took about 2 days to clear up. OBJECTIVE      42 min Therapeutic Exercise:  [x] See flow sheet :   Rationale: increase ROM, increase strength, and improve coordination to improve the patients ability to complete all activity      10 min Manual Therapy: PROM with inf and post [address] mobs   MFR to UT/LS/pec/biceps musculature    Rationale: decrease pain, increase ROM, increase tissue extensibility, decrease trigger points, and increase postural awareness to improve the patients ability to complete all activity    With   [x] TE   [] TA   [] Neuro   [] SC   [] other: Patient Education: [x] Review HEP    [] Progressed/Changed HEP based on:   [] positioning   [] body mechanics   [] transfers   [] heat/ice application    [] other:      Other Objective/Functional Measures: NA     Pain Level (0-10 scale) post treatment: 4/10    ASSESSMENT/Changes in Function:   Patient tolerated treatment session well, able to perform new exercises and progressions without increasing pain symptoms post treatment session.  Patient continues to fatigue rapidly and continues to need verbal and visual cuing to perform exercises with proper form. Patient noted increased amounts of soreness/fatigue following treatment session today. Continue to progress as tolerated. Patient will continue to benefit from skilled PT services to modify and progress therapeutic interventions, address functional mobility deficits, address ROM deficits, address strength deficits, analyze and address soft tissue restrictions, analyze and cue movement patterns, analyze and modify body mechanics/ergonomics, assess and modify postural abnormalities, and address imbalance/dizziness to attain remaining goals.      [x]  See Plan of Care  []  See progress note/recertification  []  See Discharge Summary         Progress towards goals / Updated goals:  Progressing toward goals    PLAN  [x]  Upgrade activities as tolerated     [x]  Continue plan of care  [x]  Update interventions per flow sheet       []  Discharge due to:_  []  Other:_      Beryle Soles, PTA 1/17/2023

## 2023-01-17 NOTE — PROGRESS NOTES
Remote Patient Monitoring Note      Date/Time:  1/17/2023 7:52 AM    LPN reviewed patients reported daily Remote Patient Monitoring metrics. All reported metrics are within normal limits. Current Patient Metrics ---- Activity: - mins Blood Pressure: 156/81, 80bpm Pulseox: 98%, 87bpm Survey: - Weight: 180.2lbs     Plan/Follow Up: Will continue to review, monitor and address alerts with follow up based on severity of symptoms and risk factors.

## 2023-01-18 ENCOUNTER — PATIENT OUTREACH (OUTPATIENT)
Dept: CASE MANAGEMENT | Age: 68
End: 2023-01-18

## 2023-01-18 NOTE — PROGRESS NOTES
Remote Patient Monitoring Note      Date/Time:  1/18/2023 10:26 AM    LPN reviewed patients reported daily Remote Patient Monitoring metrics. All reported metrics are within normal limits. Current Patient Metrics ---- Activity: - mins Blood Pressure: 136/90, 67bpm Pulseox: 98%, 65bpm Survey: - Weight: 181.0lbs     Plan/Follow Up: Will continue to review, monitor and address alerts with follow up based on severity of symptoms and risk factors.

## 2023-01-19 ENCOUNTER — PATIENT OUTREACH (OUTPATIENT)
Dept: CASE MANAGEMENT | Age: 68
End: 2023-01-19

## 2023-01-19 ENCOUNTER — HOSPITAL ENCOUNTER (OUTPATIENT)
Dept: PHYSICAL THERAPY | Age: 68
Discharge: HOME | End: 2023-01-19
Payer: MEDICARE

## 2023-01-19 PROCEDURE — 97110 THERAPEUTIC EXERCISES: CPT

## 2023-01-19 PROCEDURE — 97140 MANUAL THERAPY 1/> REGIONS: CPT

## 2023-01-19 NOTE — PROGRESS NOTES
Remote Patient Monitoring Note      Date/Time:  1/19/2023 7:50 AM    LPN reviewed patients reported daily Remote Patient Monitoring metrics. All reported metrics are within normal limits. Current Patient Metrics ---- Activity: - mins Blood Pressure: 148/89, 95bpm Pulseox: 98%, 83bpm Survey: - Weight: 180.6lbs     Plan/Follow Up: Will continue to review, monitor and address alerts with follow up based on severity of symptoms and risk factors.

## 2023-01-19 NOTE — PROGRESS NOTES
PT DAILY TREATMENT NOTE - [facility] 2-15    Patient Name: Rika Salvador  Date:2023  : 1955  [x]  Patient  Verified  Payor: Mili Hazard / Plan: [place] MEDICARE PART A & B / Product Type: Medicare /    In time: 373 Out time: 855  Total Treatment Time (min): 47  Total Timed Codes (min): 47  1:1 Treatment Time ( only): 32  Visit #: 6    Treatment Area: Pain in left knee [M25.562]  Pain in right shoulder [M25.511]    SUBJECTIVE  Pain Level (0-10 scale): 3/10  Any medication changes, allergies to medications, adverse drug reactions, diagnosis change, or new procedure performed?: [x] No    [] Yes (see summary sheet for update)  Subjective functional status/changes:   [] No changes reported  Patient noted they felt some increased soreness following treatment session previously, noted it did clear up following a day or so. Noted they feel as they have been able to walk a bit more stable since beginning treatment. OBJECTIVE      37 min Therapeutic Exercise:  [x] See flow sheet :   Rationale: increase ROM, increase strength, and improve coordination to improve the patients ability to complete all activity      10 min Manual Therapy: PROM with inf and post [address] mobs   MFR to UT/LS/pec/biceps musculature    Rationale: decrease pain, increase ROM, increase tissue extensibility, decrease trigger points, and increase postural awareness to improve the patients ability to complete all activity    With   [x] TE   [] TA   [] Neuro   [] SC   [] other: Patient Education: [x] Review HEP    [] Progressed/Changed HEP based on:   [] positioning   [] body mechanics   [] transfers   [] heat/ice application    [] other:      Other Objective/Functional Measures: NA     Pain Level (0-10 scale) post treatment: 3/10    ASSESSMENT/Changes in Function:   Patient tolerated treatment session well, able to perform new exercises and progressions without increasing pain symptoms post treatment session.  Patient continues to fatigue rapidly and continues to need verbal and visual cuing to perform exercises with proper form. Patient was able to progress with strengthening today during therex. Continues to note increased pain with overhead ROM, although has noted improvement. Continue to progress as tolerated. Patient will continue to benefit from skilled PT services to modify and progress therapeutic interventions, address functional mobility deficits, address ROM deficits, address strength deficits, analyze and address soft tissue restrictions, analyze and cue movement patterns, analyze and modify body mechanics/ergonomics, assess and modify postural abnormalities, and address imbalance/dizziness to attain remaining goals.      [x]  See Plan of Care  []  See progress note/recertification  []  See Discharge Summary         Progress towards goals / Updated goals:  Progressing toward goals    PLAN  [x]  Upgrade activities as tolerated     [x]  Continue plan of care  [x]  Update interventions per flow sheet       []  Discharge due to:_  []  Other:_      Marguerite Marint, PTA 1/19/2023

## 2023-01-20 ENCOUNTER — PATIENT OUTREACH (OUTPATIENT)
Dept: CASE MANAGEMENT | Age: 68
End: 2023-01-20

## 2023-01-20 NOTE — PROGRESS NOTES
Remote Patient Monitoring Note      Date/Time:  1/20/2023 8:33 AM    LPN reviewed patients reported daily Remote Patient Monitoring metrics. All reported metrics are within normal limits. Current Patient Metrics ---- Activity: - mins Blood Pressure: 136/83, 62bpm Pulseox: 98%, 75bpm Survey: - Weight: 180.8lbs    Plan/Follow Up: Will continue to review, monitor and address alerts with follow up based on severity of symptoms and risk factors.

## 2023-01-23 ENCOUNTER — PATIENT OUTREACH (OUTPATIENT)
Dept: CASE MANAGEMENT | Age: 68
End: 2023-01-23

## 2023-01-23 NOTE — PROGRESS NOTES
Remote Patient Monitoring Note      Date/Time:  1/23/2023 7:51 AM    LPN reviewed patients reported daily Remote Patient Monitoring metrics. All reported metrics are within normal limits. Current Patient Metrics ---- Activity: - mins Blood Pressure: 157/89, 77bpm Pulseox: 98%, 78bpm Survey: - Weight: 183.0lbs  Plan/Follow Up: Will continue to review, monitor and address alerts with follow up based on severity of symptoms and risk factors.

## 2023-01-24 ENCOUNTER — PATIENT OUTREACH (OUTPATIENT)
Dept: CASE MANAGEMENT | Age: 68
End: 2023-01-24

## 2023-01-24 NOTE — PROGRESS NOTES
Remote Patient Monitoring Note      Date/Time:  1/24/2023 7:44 AM    LPN reviewed patients reported daily Remote Patient Monitoring metrics. All reported metrics are within normal limits. ---- Current Patient Metrics ---- Activity: - mins Blood Pressure: 149/85, 79bpm Pulseox: 97%, 88bpm Survey: - Weight: 181.8lbs Note Created at: 01/24/2023 07:44 AM ET ---- Time-Spent: 4 minutes 0 seconds    Plan/Follow Up: Will continue to review, monitor and address alerts with follow up based on severity of symptoms and risk factors.

## 2023-01-25 ENCOUNTER — OFFICE VISIT (OUTPATIENT)
Dept: ORTHOPEDIC SURGERY | Age: 68
End: 2023-01-25
Payer: MEDICARE

## 2023-01-25 ENCOUNTER — HOSPITAL ENCOUNTER (OUTPATIENT)
Dept: PHYSICAL THERAPY | Age: 68
Discharge: HOME | End: 2023-01-25
Payer: MEDICARE

## 2023-01-25 ENCOUNTER — PATIENT OUTREACH (OUTPATIENT)
Dept: CASE MANAGEMENT | Age: 68
End: 2023-01-25

## 2023-01-25 VITALS
OXYGEN SATURATION: 99 % | DIASTOLIC BLOOD PRESSURE: 82 MMHG | RESPIRATION RATE: 18 BRPM | WEIGHT: 185 LBS | TEMPERATURE: 97 F | HEART RATE: 81 BPM | HEIGHT: 66 IN | BODY MASS INDEX: 29.73 KG/M2 | SYSTOLIC BLOOD PRESSURE: 133 MMHG

## 2023-01-25 DIAGNOSIS — M75.41 IMPINGEMENT SYNDROME OF RIGHT SHOULDER: Primary | ICD-10-CM

## 2023-01-25 DIAGNOSIS — M17.12 UNILATERAL PRIMARY OSTEOARTHRITIS, LEFT KNEE: ICD-10-CM

## 2023-01-25 PROCEDURE — 97110 THERAPEUTIC EXERCISES: CPT

## 2023-01-25 NOTE — PROGRESS NOTES
1/25/2023      CC: right shoulder, left knee pain    HPI:      This is a 74y.o. year old female who presents for a follow up visit. The patient was last seen and diagnosed with impingement right shoulder, left knee osteoarthritis. The patient's treatments since the most recent visit have comprised of PT. The patient has had moderate relief of the chief complaint. PMH:  Past Medical History:   Diagnosis Date    Ill-defined condition 8/7/15    ALLERGIC REACTION CAUSING MANY DARK SPOTS MAINLY ON ARMS & LEGS, PT MEDICATING FOR ITCHING; DENSE SKIN-COLORED RASH ON UPPER BACK    Lipoma of back 7/17/2013    Other ill-defined conditions(799.89)     FIBROCYSTIC BREAST DISEASE; OTHER CYSTS       PSxHx:  Past Surgical History:   Procedure Laterality Date    ENDOSCOPY, COLON, DIAGNOSTIC      2007    HX BREAST BIOPSY      4 benign breast surgeries-2 on each side    HX GYN  2007    partial hysterectomy. fibroid. HX LIPOMA RESECTION  8/14/15    excision of lipomas right flank and right supraumbilical region    HX ORTHOPAEDIC Bilateral     BONE REMOVAL FROM INNER ASPECT BOTH FEET    HX OTHER SURGICAL      CYST EXCISIONS FROM BIRTH CANAL    HX WISDOM TEETH EXTRACTION  11/22/2020       Meds:    Current Outpatient Medications:     diclofenac EC (VOLTAREN) 75 mg EC tablet, Take 1 Tablet by mouth two (2) times a day., Disp: 60 Tablet, Rfl: 0    losartan (COZAAR) 25 mg tablet, Take 1 Tablet by mouth daily. Per patient half a tablet daily. , Disp: 90 Tablet, Rfl: 0    All:   Allergies   Allergen Reactions    Other Food Other (comments)     POTATOES, BLUEBERRIES, CELERY, TOMATO PER ALLERGY TESTING (RECENT 8/7/15)    Iodine Hives    Other Medication Unknown (comments)     Seafood-nausea, diarrhea, pain    Sulfa (Sulfonamide Antibiotics) Swelling       Social Hx:  Social History     Socioeconomic History    Marital status:    Tobacco Use    Smoking status: Former     Packs/day: 1.00     Years: 6.00     Pack years: 6.00 Types: Cigarettes     Quit date: 2002     Years since quittin.0    Smokeless tobacco: Never   Vaping Use    Vaping Use: Never used   Substance and Sexual Activity    Alcohol use: Yes     Alcohol/week: 4.0 standard drinks     Types: 4 Shots of liquor per week     Comment: social    Drug use: No    Sexual activity: Yes     Social Determinants of Health     Financial Resource Strain: Medium Risk    Difficulty of Paying Living Expenses: Somewhat hard   Food Insecurity: No Food Insecurity    Worried About Running Out of Food in the Last Year: Never true    Ran Out of Food in the Last Year: Never true       Family Hx:  Family History   Problem Relation Age of Onset    Diabetes Mother     Heart Disease Mother     Anesth Problems Neg Hx          Review of Systems:       General: Denies headache, lethargy, fever, weight loss  Ears/Nose/Throat: Denies ear discharge, drainage, nosebleeds, hoarse voice, dental problems  Cardiovascular: Denies chest pain, shortness of breath  Lungs: Denies chest pain, breathing problems, wheezing, pneumonia  Stomach: Denies stomach pain, heartburn, constipation, irritable bowel  Skin: Denies rash, sores, open wounds  Musculoskeletal: improving pain  Genitourinary: Denies dysuria, hematuria, polyuria  Gastrointestinal: Denies constipation, obstipation, diarrhea  Neurological: Denies changes in sight, smell, hearing, taste, seizures. Denies loss of consciousness.   Psychiatric: Denies depression, sleep pattern changes, anxiety, change in personality  Endocrine: Denies mood swings, heat or cold intolerance  Hematologic/Lymphatic: Denies anemia, purpura, petechia  Allergic/Immunologic: Denies swelling of throat, pain or swelling at lymph nodes      Physical Examination:    Visit Vitals  /82   Pulse 81   Temp 97 °F (36.1 °C) (Temporal)   Resp 18   Ht 5' 6\" (1.676 m)   Wt 185 lb (83.9 kg)   SpO2 99%   BMI 29.86 kg/m²        General: AOX3, no apparent distress  Psychiatric: mood and affect appropriate  Lungs: breathing is symmetric and unlabored bilaterally  Heart: regular rate and rhythm  Abdomen: no guarding  Head: normocephalic, atraumatic  Skin: No significant abnormalities, good turgor  Sensation intact to light touch: C5-T1 dermatomes  Muscular exam: 5/5 strength in all major muscle groups unless noted in specialty exam.    Extremities    Right shoulder:  No gross deformity. No restriction to range of motion of the shoulder, elbow, wrist.  Neck range of motion is full and pain free. Muscle bulk is appropriate without wasting. Sensation is intact to light touch in the C5-T1 dermatomes. Capillary refill is less than 2 seconds in the fingers. Strength testing is 5/5 at the major muscle groups of the shoulder, elbow, and wrist.        Right lower extremity: No gross deformity. No restriction to range of motion of the hip, knee, ankle. Muscle bulk is appropriate without wasting. Sensation is intact to light touch in the L1-S1 dermatomes. Capillary refill is less than 2 seconds in the fingers. Strength testing is 5/5 at the major muscle groups of the hip, knee, ankle. Diagnostics:    Pertinent Diagnostics: none today    Assessment: OA left knee, impingement right shoulder  Plan:    We discussed at length the treatment options for this patient, she will continue to weight-bear as tolerated, she has no restrictions on my part. Plan be to have her proceed with continued physical therapy per her request, she is deferred medications, injections, surgery for now. She will follow-up with me on an as-needed basis. Ms. Yudy Mitchell has a reminder for a \"due or due soon\" health maintenance. I have asked that she contact her primary care provider for follow-up on this health maintenance.

## 2023-01-25 NOTE — PROGRESS NOTES
PT DAILY TREATMENT NOTE - [facility] 2-15    Patient Name: Becki Lamar  Date:2023  : 1955  [x]  Patient  Verified  Payor: [place] MEDICARE / Plan: [place] MEDICARE PART A & B / Product Type: Medicare /    In time: 7557 Out time: 1158  Total Treatment Time (min): 55  Total Timed Codes (min): 55  1:1 Treatment Time ([address] only): 42  Visit #: 7    Treatment Area: Pain in left knee [M25.562]  Pain in right shoulder [M25.511]    SUBJECTIVE  Pain Level (0-10 scale): 3/3 ; knee and shoulder   Any medication changes, allergies to medications, adverse drug reactions, diagnosis change, or new procedure performed?: [x] No    [] Yes (see summary sheet for update)  Subjective functional status/changes:   [] No changes reported  Patient noted they have been feeling a bit better, noted they had some increased soreness following previous treatment session. Noted their knee seems to be improving and they continue to feel increased amounts of shoulder > knee soreness. OBJECTIVE      45 min Therapeutic Exercise:  [x] See flow sheet :   Rationale: increase ROM, increase strength, and improve coordination to improve the patients ability to complete all activity      10 min Manual Therapy: PROM with inf and post [address] mobs   MFR to UT/LS/pec/biceps musculature    Rationale: decrease pain, increase ROM, increase tissue extensibility, decrease trigger points, and increase postural awareness to improve the patients ability to complete all activity    With   [x] TE   [] TA   [] Neuro   [] SC   [] other: Patient Education: [x] Review HEP    [] Progressed/Changed HEP based on:   [] positioning   [] body mechanics   [] transfers   [] heat/ice application    [] other:      Other Objective/Functional Measures: NA     Pain Level (0-10 scale) post treatment: 4/3 --> shoulder/knee    ASSESSMENT/Changes in Function:   Patient tolerated treatment session well today, able to perform exercises and progressions.  Patient continues to note painful arc with flexion and abduction of the shoulder during sidelying should series, did note slight improvement with continued repetitions. Patient continues to note rapid fatigue during treatment session as well. Continue to progress as tolerated. Patient will continue to benefit from skilled PT services to modify and progress therapeutic interventions, address functional mobility deficits, address ROM deficits, address strength deficits, analyze and address soft tissue restrictions, analyze and cue movement patterns, analyze and modify body mechanics/ergonomics, assess and modify postural abnormalities, and address imbalance/dizziness to attain remaining goals.      [x]  See Plan of Care  []  See progress note/recertification  []  See Discharge Summary         Progress towards goals / Updated goals:  Progressing toward goals    PLAN  [x]  Upgrade activities as tolerated     [x]  Continue plan of care  [x]  Update interventions per flow sheet       []  Discharge due to:_  []  Other:_      Carlos Eduardo Osuna, JACOBY 1/25/2023

## 2023-01-25 NOTE — PROGRESS NOTES
Identified pt with two pt identifiers (name and ). Reviewed chart in preparation for visit and have obtained necessary documentation. Konstantin Sanford is a 74 y.o. female  Chief Complaint   Patient presents with    Knee Pain     left knee and right shoulder pain     Visit Vitals  BP (!) 155/88 (BP 1 Location: Right arm, BP Patient Position: Sitting, BP Cuff Size: Adult)   Pulse 81   Temp 97 °F (36.1 °C) (Temporal)   Resp 18   Ht 5' 6\" (1.676 m)   Wt 185 lb (83.9 kg)   SpO2 99%   BMI 29.86 kg/m²       1. Have you been to the ER, urgent care clinic since your last visit? Hospitalized since your last visit? No    2. Have you seen or consulted any other health care providers outside of the [address] [place] [address] since your last visit? Include any pap smears or colon screening.  No

## 2023-01-25 NOTE — PROGRESS NOTES
Remote Patient Monitoring Note      Date/Time:  1/25/2023 8:42 AM    LPN reviewed patients reported daily Remote Patient Monitoring metrics. All reported metrics are within normal limits. ---- Current Patient Metrics ---- Activity: - mins Blood Pressure: 152/89, 71bpm Pulseox: 98%, 71bpm Survey: - Weight: 181.4lbs     Plan/Follow Up: Will continue to review, monitor and address alerts with follow up based on severity of symptoms and risk factors.

## 2023-01-26 ENCOUNTER — PATIENT OUTREACH (OUTPATIENT)
Dept: CASE MANAGEMENT | Age: 68
End: 2023-01-26

## 2023-01-26 NOTE — PROGRESS NOTES
Remote Patient Monitoring Note      Date/Time:  1/26/2023 9:37 AM    LPN reviewed patients reported daily Remote Patient Monitoring metrics. All reported metrics are within normal limits. ---- Current Patient Metrics ---- Activity: - mins Blood Pressure: 127/78, 89bpm Pulseox: 97%, 99bpm   Plan/Follow Up: Will continue to review, monitor and address alerts with follow up based on severity of symptoms and risk factors.

## 2023-01-26 NOTE — PROGRESS NOTES
All RPM data thus far:              Remote Patient Monitoring Care Plan      Date/Time:  1/26/2023 11:54 AM    Patient is currently enrolled in the [facility] Patient Monitoring (RPM) program for in home monitoring for HTN. Patient will be monitoring blood pressure , pulse ox , weight, survey questions, and disease specific education modules  daily. Interventions: Reviewed metrics needed for daily management of chronic disease-HTN     Short Term Goal: Patient will engage in Remote Patient Monitoring each day to develop the skills necessary for self management. Long Term Goal: Patient will be able to complete self management skills daily for better disease management.      Care Team Responsibilities:   Alerts will be reviewed daily and addressed within 2-4 hours during operational hours (Monday -Friday 9 am-4 pm)  Alert response and intervention documented in patient medical record   Patient monitored over  days  Discharge from program based on Self-Management Readiness assessment     Goals Addressed                   This Visit's Progress     Supportive resources in place to maintain patient in the community for HTN-remote patient monitoring DME equipment   On track     1/27/23-shiv  -spoke to pt's  (hipaa) and he asked that pt be called on her cell phone  -left message for pt on her cell number     1/26/23-shiv  -RPM care plan documented in progress note  -pt has been in the program for 39 days for HTN; VSS  -all RPM data was entered in progress note and sent to Dr Anjel Hodge  -left message on both phone numbers asking pt to call back  -will continue to monitor  -patient needs continued CM services and her case is transitioned to Arun Lazaro RN, ACM)       12/15/22-shiv  -pt is currently enrolled in the [facility] Patient Monitoring (RPM) program for in home monitoring for HTN and will be engaged in monitoring weight, activity, blood pressure, survey questions and disease specific education modules to develop the skills necessary for self management  -will review, monitor and address alerts with follow up based on severity of symptoms and risk factors

## 2023-01-27 ENCOUNTER — PATIENT OUTREACH (OUTPATIENT)
Dept: CASE MANAGEMENT | Age: 68
End: 2023-01-27

## 2023-01-27 ENCOUNTER — APPOINTMENT (OUTPATIENT)
Dept: PHYSICAL THERAPY | Age: 68
End: 2023-01-27
Payer: MEDICARE

## 2023-01-27 NOTE — PROGRESS NOTES
Remote Patient Monitoring Note      Date/Time:  1/27/2023 7:35 AM    LPN reviewed patients reported daily Remote Patient Monitoring metrics. All reported metrics are within normal limits. ---- Current Patient Metrics ---- Activity: - mins Blood Pressure: 138/87, 71bpm Pulseox: 96%, 71bpm Survey: - Weight: 181.0lbs Note Created at: 01/27/2023 07:35 AM ET ---- Time-Spent: 3 minutes 0 seconds   Plan/Follow Up: Will continue to review, monitor and address alerts with follow up based on severity of symptoms and risk factors.

## 2023-01-30 ENCOUNTER — PATIENT OUTREACH (OUTPATIENT)
Dept: CASE MANAGEMENT | Age: 68
End: 2023-01-30

## 2023-01-30 ENCOUNTER — TELEPHONE (OUTPATIENT)
Dept: INTERNAL MEDICINE CLINIC | Age: 68
End: 2023-01-30

## 2023-01-30 DIAGNOSIS — I10 ESSENTIAL HYPERTENSION: Primary | ICD-10-CM

## 2023-01-30 RX ORDER — LOSARTAN POTASSIUM 50 MG/1
50 TABLET ORAL DAILY
Qty: 30 TABLET | Refills: 1 | Status: SHIPPED | OUTPATIENT
Start: 2023-01-30

## 2023-01-30 RX ORDER — LOSARTAN POTASSIUM 50 MG/1
TABLET ORAL DAILY
COMMUNITY
End: 2023-01-30

## 2023-01-30 NOTE — PROGRESS NOTES
Remote Patient Monitoring Note      Date/Time:  1/30/2023 8:39 AM    LPN reviewed patients reported daily Remote Patient Monitoring metrics. All reported metrics are within normal limits. Plan/Follow Up: Will continue to review, monitor and address alerts with follow up based on severity of symptoms and risk factors.

## 2023-01-30 NOTE — LETTER
1/30/2023 1:26 PM    Ms. Romano 16 [phone]    Hello Mrs Amber Yoder,    As we discussed I am sending you some information on nutrition. There is also educational materials and info on your remote monitoring tablet that you can refer to. Take care & I will talk with you soon!     Best regards,    Mary Funes, MSN, RN - Ambulatory Care Manager - [phone]

## 2023-01-30 NOTE — TELEPHONE ENCOUNTER
Losartan increased 50 mg daily based on home measurements. Diagnoses and all orders for this visit:    1. Essential hypertension  -     losartan (COZAAR) 50 mg tablet; Take 1 Tablet by mouth daily.

## 2023-01-30 NOTE — PROGRESS NOTES
Ambulatory Care Management Note    Date/Time:  1/30/2023 1:08 PM    This patient was received as a referral from Care Transition Nurse. Ambulatory Care Manager outreached to patient today to offer care management services. Introduction to self and role of care manager provided. Patient accepted care management services at this time. Follow up call scheduled at this time. Patient has Ambulatory Care Manager's contact number for any questions or concerns. Ambulatory Care Management Note    Date/Time:  1/30/2023 1:10 PM    This Ambulatory Care Manager (ACMACKENZIE) reviewed and updated the following screenings during this call; general assessment, self management assessment, SDOH assessments, and medication reconciliation     Patient's challenges to self-management identified:   lack of knowledge about disease, level of motivation, and support system      Medication Management:  good adherence and good understanding. Pt takes her Losartan at night, had dizziness initially. Discussed rising slowly to avoid orthostasis. Per Dr Ottoniel Myers, pt was advised to increase her Losartan dose to 50 mg daily. Will send message to PCP for updated refill. Advance Care Planning:   Does patient have an Advance Directive:   will discuss on later call    [employer], Referrals, and Durable Medical Equipment: none      Health Maintenance Due   Topic Date Due    Hepatitis C Screening  Never done    COVID-19 Vaccine (1) Never done    DTaP/Tdap/Td series (1 - Tdap) Never done    Colorectal Cancer Screening Combo  Never done    Shingles Vaccine (1 of 2) Never done    Medicare Yearly Exam  Never done    Pneumococcal 65+ years (1 - PCV) Never done     Health Maintenance Reviewed: will discuss on next call    Ambulatory Care Management Note      Date/Time:  1/30/2023 1:25 PM    This patient was received as a referral from Care Transition Nurse. Top Challenges reviewed with the provider   Pt would like to lose ~ 30 lbs.  Working on improving diet & exercise             Ambulatory  contacted patient for discussion and case management of RPM/HTN   Summary of patients top problems:   HTN - In RPM program now for 43 days, pt has been compliant. Her Losartan was increased to 50 mg daily today (1/20/23). Right shoulder pain - Continues PT for this, pt reports this has helped some. She is a  (3 days a week) which perhaps has contributed to her pain. PCP/Specialist follow up:   Future Appointments   Date Time Provider Hina Sumner   2/1/2023  9:30 AM Bridgett Burnett, PTA [facility]   2/3/2023  9:00 AM Francisco Griffith, PT [facility]   2/8/2023 10:30 AM Bridgett Burnett, PTA [facility]   2/10/2023  8:30 AM Francisco Griffith, PT [facility]   2/15/2023  9:30 AM Bridgett Burnett, PTA [facility]   2/17/2023  8:00 AM Bridgett Burnett, PTA [facility]   2/21/2023  8:30 AM Francisco Griffith, PT [facility]   2/23/2023  8:30 AM Francisco Griffith, PT [facility]   2/28/2023  9:00 AM Sadie Mobley MD [facility]   2/28/2023 10:00 AM Francisco Griffith, PT [facility]   3/2/2023  8:30 AM Francisco Griffith, PT [facility]           Goals        Supportive resources in place to maintain patient in the community for HTN-remote patient monitoring DME equipment      01/30/23  Discussed pt's BP readings, she has had some higher reading which she relates to lifestyle/busy schedule  Instructed pt to increase her Losartan to 50 mg per PCP  Discussed benefits of low sodium/no added salt diet. Discussed My Plate & will send her info  Discussed benefits of exercise (pt wants to lose 30 lbs, feel she has plateaued) & alternating different types of exercise - pt currently rides stationary bike 4 miles several times a week - suggested adding yoga/pilates/strength training  Plan to follow up with pt in 7-10 days.    Kierra Paez, MSN, RN - Ambulatory Care Manager - [phone]     1/27/23-shiv  -spoke to pt's  (hipaa) and he asked that pt be called on her cell phone  -left message for pt on her cell number     1/26/23-robertw  -RPM care plan documented in progress note  -pt has been in the program for 39 days for HTN; VSS  -all RPM data was entered in progress note and sent to Dr Rosa Funes  -left message on both phone numbers asking pt to call back  -will continue to monitor  -patient needs continued CM services and her case is transitioned to Ehsan Wang RN, ACM)       12/15/22-robertw  -pt is currently enrolled in the [facility] Patient Monitoring (RPM) program for in home monitoring for HTN and will be engaged in monitoring weight, activity, blood pressure, survey questions and disease specific education modules to develop the skills necessary for self management  -will review, monitor and address alerts with follow up based on severity of symptoms and risk factors                 Patient verbalized understanding of all information discussed. Patient has this Ambulatory Care Manager's contact information for any further questions, concerns, or needs.

## 2023-01-31 ENCOUNTER — PATIENT OUTREACH (OUTPATIENT)
Dept: CASE MANAGEMENT | Age: 68
End: 2023-01-31

## 2023-01-31 NOTE — PROGRESS NOTES
Remote Patient Monitoring Note      Date/Time:  1/31/2023 8:04 AM    LPN reviewed patients reported daily Remote Patient Monitoring metrics. All reported metrics are within normal limits. Current Patient Metrics ---- Activity: - mins Blood Pressure: 160/81, 68bpm Pulseox: 98%, 73bpm Survey: - Weight: 181.4lb  Plan/Follow Up: Will continue to review, monitor and address alerts with follow up based on severity of symptoms and risk factors.

## 2023-02-01 ENCOUNTER — PATIENT OUTREACH (OUTPATIENT)
Dept: CASE MANAGEMENT | Age: 68
End: 2023-02-01

## 2023-02-01 ENCOUNTER — HOSPITAL ENCOUNTER (OUTPATIENT)
Dept: PHYSICAL THERAPY | Age: 68
Discharge: HOME | End: 2023-02-01
Payer: MEDICARE

## 2023-02-01 PROCEDURE — 97110 THERAPEUTIC EXERCISES: CPT

## 2023-02-01 NOTE — PROGRESS NOTES
Remote Patient Monitoring Note      Date/Time:  2/1/2023 8:52 AM    LPN reviewed patients reported daily Remote Patient Monitoring metrics. All reported metrics are within normal limits. Current Patient Metrics ---- Activity: - mins Blood Pressure: 141/89, 78bpm Pulseox: 98%, 71bpm Survey: - Weight: 182.0lbs  Plan/Follow Up: Will continue to review, monitor and address alerts with follow up based on severity of symptoms and risk factors.

## 2023-02-01 NOTE — PROGRESS NOTES
PT DAILY TREATMENT NOTE - [facility] 2-15    Patient Name: Vijay Asa  Date:2023  : 1955  [x]  Patient  Verified  Payor: Edwardanalilia Nones / Plan: [place] MEDICARE PART A & B / Product Type: Medicare /    In time: 80 Out time: 1020  Total Treatment Time (min): 50  Total Timed Codes (min): 50  1:1 Treatment Time ( only): 40  Visit #: 8    Treatment Area: Pain in left knee [M25.562]  Pain in right shoulder [M25.511]    SUBJECTIVE  Pain Level (0-10 scale): 3/2 ; knee and shoulder   Any medication changes, allergies to medications, adverse drug reactions, diagnosis change, or new procedure performed?: [x] No    [] Yes (see summary sheet for update)  Subjective functional status/changes:   [] No changes reported  Patient continues to note soreness following previous treatment session. Noted their shoulder has gotten better with range of motion, sometimes they are able to use it and do things they don't think they were able to before. Noted when they do their exercises at home they usually stop after 5-6 repetitions when they get hard and noted they feel like they are doing them differently at home than in therapy.    OBJECTIVE      50 min Therapeutic Exercise:  [x] See flow sheet :   Rationale: increase ROM, increase strength, and improve coordination to improve the patients ability to complete all activity      NT min Manual Therapy: PROM with inf and post [facility] mobs   MFR to UT/LS/pec/biceps musculature    Rationale: decrease pain, increase ROM, increase tissue extensibility, decrease trigger points, and increase postural awareness to improve the patients ability to complete all activity    With   [x] TE   [] TA   [] Neuro   [] SC   [] other: Patient Education: [x] Review HEP    [] Progressed/Changed HEP based on:   [] positioning   [] body mechanics   [] transfers   [] heat/ice application    [] other:      Other Objective/Functional Measures: NA     Pain Level (0-10 scale) post treatment: 4/2 --> shoulder/knee    ASSESSMENT/Changes in Function:   Patient tolerated treatment session well today, able to perform new exercises and progressions. Able to progress with overhead shoulder strengthening, continues to note painful arc with sidelying abduction and flexion but improved from previous treatment session. Patient needed frequent cuing during exercises today to perform with proper form during treatment session. Patient did note some irritation holding a mini squat position during side steps, noted improvement with pain with extended knee. Continue to progress as tolerated. Patient will continue to benefit from skilled PT services to modify and progress therapeutic interventions, address functional mobility deficits, address ROM deficits, address strength deficits, analyze and address soft tissue restrictions, analyze and cue movement patterns, analyze and modify body mechanics/ergonomics, assess and modify postural abnormalities, and address imbalance/dizziness to attain remaining goals.      [x]  See Plan of Care  []  See progress note/recertification  []  See Discharge Summary         Progress towards goals / Updated goals:  Progressing toward goals    PLAN  [x]  Upgrade activities as tolerated     [x]  Continue plan of care  [x]  Update interventions per flow sheet       []  Discharge due to:_  []  Other:_      Alexandra Nagel, JACOBY 2/1/2023

## 2023-02-02 ENCOUNTER — PATIENT OUTREACH (OUTPATIENT)
Dept: CASE MANAGEMENT | Age: 68
End: 2023-02-02

## 2023-02-02 NOTE — PROGRESS NOTES
Remote Patient Monitoring Note      Date/Time:  2/2/2023 9:16 AM    LPN reviewed patients reported daily Remote Patient Monitoring metrics. All reported metrics are within normal limits. Current Patient Metrics ---- Activity: - mins Blood Pressure: 158/80, 68bpm Pulseox: 97%, 79bpm Survey: - Weight: 181.6lbs  Plan/Follow Up: Will continue to review, monitor and address alerts with follow up based on severity of symptoms and risk factors.

## 2023-02-03 ENCOUNTER — APPOINTMENT (OUTPATIENT)
Dept: PHYSICAL THERAPY | Age: 68
End: 2023-02-03
Payer: MEDICARE

## 2023-02-03 ENCOUNTER — PATIENT OUTREACH (OUTPATIENT)
Dept: CASE MANAGEMENT | Age: 68
End: 2023-02-03

## 2023-02-03 NOTE — PROGRESS NOTES
Remote Patient Monitoring Note      Date/Time:  2/3/2023 8:42 AM    LPN reviewed patients reported daily Remote Patient Monitoring metrics. All reported metrics are within normal limits. Current Patient Metrics ---- Activity: - mins Blood Pressure: 158/80, 68bpm Pulseox: 97%, 79bpm Survey: - Weight: 181.6lb  Plan/Follow Up: Will continue to review, monitor and address alerts with follow up based on severity of symptoms and risk factors.

## 2023-02-06 ENCOUNTER — PATIENT OUTREACH (OUTPATIENT)
Dept: CASE MANAGEMENT | Age: 68
End: 2023-02-06

## 2023-02-06 NOTE — PROGRESS NOTES
Remote Patient Monitoring Note      Date/Time:  2/6/2023 7:58 AM    LPN reviewed patients reported daily Remote Patient Monitoring metrics. All reported metrics are within normal limits. Current Patient Metrics ---- Activity: - mins Blood Pressure: 145/90, 91bpm Pulseox: 97%, 95bpm Survey: - Weight: 179.8lbs   Plan/Follow Up: Will continue to review, monitor and address alerts with follow up based on severity of symptoms and risk factors.

## 2023-02-07 ENCOUNTER — PATIENT OUTREACH (OUTPATIENT)
Dept: CASE MANAGEMENT | Age: 68
End: 2023-02-07

## 2023-02-07 NOTE — PROGRESS NOTES
Remote Patient Monitoring Note      Remote Patient Monitoring Note      Date/Time:  2/7/2023 9:09 AM    LPN reviewed patients reported daily Remote Patient Monitoring metrics. All reported metrics are within normal limits. Activity: - mins Blood Pressure: 133/94, 110bpm Pulseox: 96%, 113bpm Survey: - Weight: 177.8lbs  Plan/Follow Up: Will continue to review, monitor and address alerts with follow up based on severity of symptoms and risk factors.

## 2023-02-08 ENCOUNTER — HOSPITAL ENCOUNTER (OUTPATIENT)
Dept: PHYSICAL THERAPY | Age: 68
Discharge: HOME | End: 2023-02-08
Payer: MEDICARE

## 2023-02-08 ENCOUNTER — PATIENT OUTREACH (OUTPATIENT)
Dept: CASE MANAGEMENT | Age: 68
End: 2023-02-08

## 2023-02-08 PROCEDURE — 97110 THERAPEUTIC EXERCISES: CPT

## 2023-02-08 NOTE — PROGRESS NOTES
Remote Patient Monitoring Note      Date/Time:  2/8/2023 3:06 PM    LPN reviewed patients reported daily Remote Patient Monitoring metrics. All reported metrics are within normal limits. Current Patient Metrics ---- Activity: - mins Blood Pressure: 125/80, 93bpm Pulseox: 96%, 92bpm Survey: - Weight: 176.4lbs    Plan/Follow Up: Will continue to review, monitor and address alerts with follow up based on severity of symptoms and risk factors.

## 2023-02-08 NOTE — PROGRESS NOTES
PT DAILY TREATMENT NOTE - [facility] 2-15    Patient Name: Bird Koroma  Date:2023  : 1955  [x]  Patient  Verified  Payor: [place] MEDICARE / Plan: [place] MEDICARE PART A & B / Product Type: Medicare /    In time: 3008 Out time: 1120  Total Treatment Time (min): 45  Total Timed Codes (min): 45  1:1 Treatment Time ( only): 25  Visit #: 9    Treatment Area: Pain in left knee [M25.562]  Pain in right shoulder [M25.511]    SUBJECTIVE  Pain Level (0-10 scale): 3/2 ; knee and shoulder   Any medication changes, allergies to medications, adverse drug reactions, diagnosis change, or new procedure performed?: [x] No    [] Yes (see summary sheet for update)  Subjective functional status/changes:   [] No changes reported  Patient noted they have continued to \"kind of work on exercises,\" at home, noted they have continued only been performing 3-5 repetitions, \"usually 2x/week,\" of their shoulder exercises cause \"that's when things get very difficult,\". Patient noted they have had about 50% improvement with their shoulder since initial visit and about 40% with their knee. OBJECTIVE      45 min Therapeutic Exercise:  [x] See flow sheet :   Rationale: increase ROM, increase strength, and improve coordination to improve the patients ability to complete all activity      NT min Manual Therapy: PROM with inf and post [address] mobs   MFR to UT/LS/pec/biceps musculature    Rationale: decrease pain, increase ROM, increase tissue extensibility, decrease trigger points, and increase postural awareness to improve the patients ability to complete all activity    With   [x] TE   [] TA   [] Neuro   [] SC   [] other: Patient Education: [x] Review HEP    [] Progressed/Changed HEP based on:   [] positioning   [] body mechanics   [] transfers   [] heat/ice application    [] other:      Other Objective/Functional Measures:    FOTO: Shoulder=54/100  Knee=57/100    LOWER QUARTER                            MUSCLE STRENGTH  KEY R                      L  0 - No Contraction                   Knee ext                      4+                     4  1 - Trace                                           flex                     4+                      4  2 - Poor                                   Hip ext                         NT                   NT  3 - Fair                                           flex                        4                      4  4 - Good                                        abd                        4+                    4+  5 - Normal                                     add                        NT                   NT                                                          ER                       4                      3+                                                  Ankle DF                     4+                     4+                                                                 UPPER QUARTER                             MUSCLE STRENGTH  KEY                                                                             R                      L  0 - No Contraction                               Flexion             3                      3+  1 - Trace                                              Extension        NT                   NT  2 - Poor                                               Abduction        3                      3+  3 - Fair                                                 IR                    4                      4  4 - Good                                              ER                   3                      3+  5 - Normal                                     Pain Level (0-10 scale) post treatment: 4/2 --> shoulder/knee    ASSESSMENT/Changes in Function:   Patient is a 74year old being seen for right shoulder and left hip/knee pain.  Patient has been seen for 9 visits and has made improvements with lower quarter strength and upper quarter strength during reassessment today. Patient demonstrated improved knee extension (R/L=4+/4  vs R/L=4/3 previously), knee flexion (R=4+ vs R=4), hip flexion (R/L=4 vs R/L=3/2 previously), hip abduction (R/L=4+/4+ vs R/L=3/2 previously). Patient noted slight improvement with L shoulder strength, minimal to no change in UE strength during muscle testing today, may be partly due to decreased HEP compliance related to their shoulder. Emphasized importance of HEP compliance and advised patient to perform 3-4x/week at least versus subjective report of 2x/week. Patient continues to make slow and steady progress towards remaining goals for treatment met or intermittently met 3/9 goals set for treatment with progress being made towards final goals for rehab. Patient will continue to benefit from skilled PT services to modify and progress therapeutic interventions, address functional mobility deficits, address ROM deficits, address strength deficits, analyze and address soft tissue restrictions, analyze and cue movement patterns, analyze and modify body mechanics/ergonomics, assess and modify postural abnormalities, and address imbalance/dizziness to attain remaining goals. [x]  See Plan of Care  []  See progress note/recertification  []  See Discharge Summary         Progress towards goals / Updated goals:  Short Term Goals: To be accomplished in 8-10 treatments:              Pt will be consistent and demonstrate I with HEP-intermittently met --- 2x/ week   Pt will complain of pain 1-2/10 with all activity-progressing   Pt will demonstrate improved ROM to complete all ADL's and household chores more efficiently-MET   Pt will be able to maintain positions for 30-45 min without increased symptoms-10-15 minutes     Long Term Goals:  To be accomplished in 15-20 treatments:              Pt will complain of pain 0-1/10 with all activity-progressing  Pt will increase strength to stabilize the core and shoulder to complete all activity through full ROM without increased symptoms-progressing  Pt will increase FOTO score by 9 points to meet [address] and demonstrate improved function with all activity-Progressing  Pt will return to exercise routine and get back into her walking program without increased symptoms-MET  Pt will use proper form and posture to complete all work tasks without increased symptoms 100% of the time-Progressing     PLAN  [x]  Upgrade activities as tolerated     [x]  Continue plan of care  [x]  Update interventions per flow sheet       []  Discharge due to:_  []  Other:_      Dock Lane, PTA 2/8/2023

## 2023-02-09 ENCOUNTER — PATIENT OUTREACH (OUTPATIENT)
Dept: CASE MANAGEMENT | Age: 68
End: 2023-02-09

## 2023-02-09 NOTE — PROGRESS NOTES
Remote Patient Monitoring Note      Date/Time:  2/9/2023 10:38 AM    LPN reviewed patients reported daily Remote Patient Monitoring metrics. All reported metrics are within normal limits. Current Patient Metrics ---- Activity: - mins Blood Pressure: 125/86, 86bpm Pulseox: 95%, 92bpm Survey: - Weight: 177.4lbs  Plan/Follow Up: Will continue to review, monitor and address alerts with follow up based on severity of symptoms and risk factors.

## 2023-02-10 ENCOUNTER — PATIENT OUTREACH (OUTPATIENT)
Dept: CASE MANAGEMENT | Age: 68
End: 2023-02-10

## 2023-02-10 ENCOUNTER — APPOINTMENT (OUTPATIENT)
Dept: PHYSICAL THERAPY | Age: 68
End: 2023-02-10
Payer: MEDICARE

## 2023-02-10 NOTE — PROGRESS NOTES
Remote Patient Monitoring Note      Date/Time:  2/10/2023 7:38 AM    LPN reviewed patients reported daily Remote Patient Monitoring metrics. All reported metrics are within normal limits. ---- Current Patient Metrics ---- Activity: - mins Blood Pressure: 140/87, 76bpm Pulseox: 95%, 77bpm Survey: - Weight: 178.2lbs Note Created at: 02/10/2023 07:39 AM ET ---- Time-Spent: 3 minutes 0 seconds    Plan/Follow Up: Will continue to review, monitor and address alerts with follow up based on severity of symptoms and risk factors.

## 2023-02-10 NOTE — PROGRESS NOTES
[address] Physical Therapy  [address] ([facility] [address] [place]  Phone: [phone] Fax: [phone]    Progress Note    Name: Jayne Goetz   : 1955   MD: Sharda Phillips,        Treatment Diagnosis: Pain in left knee [M25.562]  Pain in right shoulder [M25.511]  Start of Care: 1/3/2023    Visits from Start of Care: 9  Missed Visits: 1    Summary of Care:Patient is a 74year old being seen for right shoulder and left hip/knee pain. Patient has been seen for 9 visits and treated using therapeutic exercises and manual therapy to make improvements with lower quarter strength and upper quarter strength during reassessment today. Assessment / Recommendations: Patient is a 74year old being seen for right shoulder and left hip/knee pain. Patient has been seen for 9 visits and has made improvements with lower quarter strength and upper quarter strength during reassessment today. Patient demonstrated improved knee extension (R/L=4+/4  vs R/L=4/3 previously), knee flexion (R=4+ vs R=4), hip flexion (R/L=4 vs R/L=3/2 previously), hip abduction (R/L=4+/4+ vs R/L=3/2 previously). Patient noted slight improvement with L shoulder strength, minimal to no change in UE strength during muscle testing today, may be partly due to decreased HEP compliance related to their shoulder. Emphasized importance of HEP compliance and advised patient to perform 3-4x/week at least versus subjective report of 2x/week. Patient continues to make slow and steady progress towards remaining goals for treatment met or intermittently met 3/9 goals set for treatment with progress being made towards final goals for rehab.    Patient will continue to benefit from skilled PT services to modify and progress therapeutic interventions, address functional mobility deficits, address ROM deficits, address strength deficits, analyze and address soft tissue restrictions, analyze and cue movement patterns, analyze and modify body mechanics/ergonomics, assess and modify postural abnormalities, and address imbalance/dizziness to attain remaining goals. Short Term Goals: To be accomplished in 8-10 treatments:              Pt will be consistent and demonstrate I with HEP-intermittently met --- 2x/ week   Pt will complain of pain 1-2/10 with all activity-progressing   Pt will demonstrate improved ROM to complete all ADL's and household chores more efficiently-MET   Pt will be able to maintain positions for 30-45 min without increased symptoms-10-15 minutes     Long Term Goals: To be accomplished in 15-20 treatments:              Pt will complain of pain 0-1/10 with all activity-progressing  Pt will increase strength to stabilize the core and shoulder to complete all activity through full ROM without increased symptoms-progressing  Pt will increase FOTO score by 9 points to meet MDC and demonstrate improved function with all activity-Progressing  Pt will return to exercise routine and get back into her walking program without increased symptoms-MET  Pt will use proper form and posture to complete all work tasks without increased symptoms 100% of the time-Progressing     Other: Continue per POC to address remaining goals for treatment.          Greyson Stovall, PTA 2/10/2023

## 2023-02-10 NOTE — PROGRESS NOTES
02/10/23  Patient Current Location: [address] [place] [address]                   This Visit's Progress     Supportive resources in place to maintain patient in the community for HTN-remote patient monitoring DME equipment        02/10/23  Pt unable to talk long, on her way to work  States she is feeling fine, taking meds as directed  BP has been 120s-140s/80s  Plan to follow up with pt in 2 weeks, pt sees PCP 2/28. Sadie Vega, MSN, RN - Ambulatory Care Manager - [phone]     02/07/23   Attempted to outreach with pt for CM follow up. LM to return this call. -MLL  2:56 PM  Attempted to reach pt again. Will continue to monitor daily RPM metrics. -MLL    01/30/23  Discussed pt's BP readings, she has had some higher reading which she relates to lifestyle/busy schedule  Instructed pt to increase her Losartan to 50 mg per PCP  Discussed benefits of low sodium/no added salt diet. Discussed My Plate & will send her info  Discussed benefits of exercise (pt wants to lose 30 lbs, feel she has plateaued) & alternating different types of exercise - pt currently rides stationary bike 4 miles several times a week - suggested adding yoga/pilates/strength training  Plan to follow up with pt in 7-10 days.    ESTRELLA Barnett, RN - Ambulatory Care Manager - [phone]     1/27/23-dkdipika  -spoke to pt's  (hipaa) and he asked that pt be called on her cell phone  -left message for pt on her cell number     1/26/23-dkw  -RPM care plan documented in progress note  -pt has been in the program for 39 days for HTN; VSS  -all RPM data was entered in progress note and sent to Dr Nirmala Moise  -left message on both phone numbers asking pt to call back  -will continue to monitor  -patient needs continued CM services and her case is transitioned to Sadie Vega, RN, ACM)       12/15/22-dkdipika  -pt is currently enrolled in the [facility] Patient Monitoring (RPM) program for in home monitoring for HTN and will be engaged in monitoring weight, activity, blood pressure, survey questions and disease specific education modules to develop the skills necessary for self management  -will review, monitor and address alerts with follow up based on severity of symptoms and risk factors

## 2023-02-13 ENCOUNTER — PATIENT OUTREACH (OUTPATIENT)
Dept: CASE MANAGEMENT | Age: 68
End: 2023-02-13

## 2023-02-13 NOTE — PROGRESS NOTES
Remote Patient Monitoring Note      Date/Time:  2/13/2023 7:48 AM    LPN reviewed patients reported daily Remote Patient Monitoring metrics. All reported metrics are within normal limits. ---- Current Patient Metrics ---- Activity: - mins Blood Pressure: 121/72, 84bpm Pulseox: 97%, 79bpm Survey: - Weight: 177.2lbs Note Created at: 02/13/2023 07:48 AM ET ---- Time-Spent: 4 minutes 0 seconds  Plan/Follow Up: Will continue to review, monitor and address alerts with follow up based on severity of symptoms and risk factors.

## 2023-02-14 ENCOUNTER — PATIENT OUTREACH (OUTPATIENT)
Dept: CASE MANAGEMENT | Age: 68
End: 2023-02-14

## 2023-02-14 NOTE — PROGRESS NOTES
Remote Patient Monitoring Note      Date/Time:  2/14/2023 7:51 AM    LPN reviewed patients reported daily Remote Patient Monitoring metrics. All reported metrics are within normal limits. - Current Patient Metrics ---- Activity: - mins Blood Pressure: 128/77, 82bpm Pulseox: 96%, 88bpm Survey: - Weight: 176.6lbs Note Created at: 02/14/2023 07:51 AM ET ---- Time-Spent: 4 minutes 0 seconds  Plan/Follow Up: Will continue to review, monitor and address alerts with follow up based on severity of symptoms and risk factors.

## 2023-02-15 ENCOUNTER — HOSPITAL ENCOUNTER (OUTPATIENT)
Dept: PHYSICAL THERAPY | Age: 68
Discharge: HOME | End: 2023-02-15
Payer: MEDICARE

## 2023-02-15 ENCOUNTER — PATIENT OUTREACH (OUTPATIENT)
Dept: CASE MANAGEMENT | Age: 68
End: 2023-02-15

## 2023-02-15 PROCEDURE — 97110 THERAPEUTIC EXERCISES: CPT

## 2023-02-15 NOTE — PROGRESS NOTES
PT DAILY TREATMENT NOTE - [facility] 2-15    Patient Name: Maurilio Borden  Date:2/15/2023  : 1955  [x]  Patient  Verified  [place]or: [place] MED[place] / Plan: [place] MEDICARE PART A & B / Product Type: Medicare /    In time: 893 Out time: 1030  Total Treatment Time (min): 51  Total Timed Codes (min): 51  1:1 Treatment Time ( only): 42  Visit #: 10    Treatment Area: Pain in left knee [M25.562]  Pain in right shoulder [M25.511]    SUBJECTIVE  Pain Level (0-10 scale): 4/2 ; knee and shoulder   Any medication changes, allergies to medications, adverse drug reactions, diagnosis change, or new procedure performed?: [x] No    [] Yes (see summary sheet for update)  Subjective functional status/changes:   [] No changes reported  Patient noted they were feeling an increased amount of knee pain today, believes it may be due to weather. Patient also noted they have been trying to get through exercises 1x/day and that \"it's been rough,\". Noted their shoulder continues to feel fine when they aren't moving it but have been trying to focus on performing their exercises with proper form at home.    OBJECTIVE      51 min Therapeutic Exercise:  [x] See flow sheet :   Rationale: increase ROM, increase strength, and improve coordination to improve the patients ability to complete all activity      NT min Manual Therapy: PROM with inf and post [address] [facility] mobs   MFR to UT/LS/pec/biceps musculature    Rationale: decrease pain, increase ROM, increase tissue extensibility, decrease trigger points, and increase postural awareness to improve the patients ability to complete all activity    With   [x] TE   [] TA   [] Neuro   [] SC   [] other: Patient Education: [x] Review HEP    [] Progressed/Changed HEP based on:   [] positioning   [] body mechanics   [] transfers   [] heat/ice application    [] other:      Other Objective/Functional Measures: NA    Pain Level (0-10 scale) post treatment: 1/3 --> shoulder/knee    ASSESSMENT/Changes in Function: Patient tolerated treatment session well today, able to perform exercises and progressions while decreasing pain symptoms post treatment session. Patient continues to need verbal cuing for exercises focusing on their shoulder, especially serratus punches and theraband external rotation today. Patient did need less cuing for their sidelying shoulder exercises, wall slides, and rows. Patient continues to note rapid fatigue with shoulder exercises but has shown improved tolerance to overhead movements. Continue to progress as tolerated. Patient will continue to benefit from skilled PT services to modify and progress therapeutic interventions, address functional mobility deficits, address ROM deficits, address strength deficits, analyze and address soft tissue restrictions, analyze and cue movement patterns, analyze and modify body mechanics/ergonomics, assess and modify postural abnormalities, and address imbalance/dizziness to attain remaining goals. [x]  See Plan of Care  []  See progress note/recertification  []  See Discharge Summary         Progress towards goals / Updated goals:  Short Term Goals: To be accomplished in 8-10 treatments:              Pt will be consistent and demonstrate I with HEP-intermittently met --- 2x/ week   Pt will complain of pain 1-2/10 with all activity-progressing   Pt will demonstrate improved ROM to complete all ADL's and household chores more efficiently-MET   Pt will be able to maintain positions for 30-45 min without increased symptoms-10-15 minutes     Long Term Goals:  To be accomplished in 15-20 treatments:              Pt will complain of pain 0-1/10 with all activity-progressing  Pt will increase strength to stabilize the core and shoulder to complete all activity through full ROM without increased symptoms-progressing  Pt will increase FOTO score by 9 points to meet MDC and demonstrate improved function with all activity-Progressing  Pt will return to exercise routine and get back into her walking program without increased symptoms-MET  Pt will use proper form and posture to complete all work tasks without increased symptoms 100% of the time-Progressing     PLAN  [x]  Upgrade activities as tolerated     [x]  Continue plan of care  [x]  Update interventions per flow sheet       []  Discharge due to:_  []  Other:_      Lino Henson, PTA 2/15/2023

## 2023-02-15 NOTE — PROGRESS NOTES
Remote Patient Monitoring Note      Date/Time:  2/15/2023 8:44 AM    LPN reviewed patients reported daily Remote Patient Monitoring metrics. All reported metrics are within normal limits. ---- Current Patient Metrics ---- Activity: - mins Blood Pressure: 138/84, 80bpm Pulseox: 97%, 83bpm Survey: - Weight: 176.8lbs Note Created at: 02/15/2023 08:45 AM ET ---- Time-Spent: 4 minutes 0 seconds  Plan/Follow Up: Will continue to review, monitor and address alerts with follow up based on severity of symptoms and risk factors.

## 2023-02-16 ENCOUNTER — PATIENT OUTREACH (OUTPATIENT)
Dept: CASE MANAGEMENT | Age: 68
End: 2023-02-16

## 2023-02-16 NOTE — PROGRESS NOTES
Remote Patient Monitoring Note      Date/Time:  2/16/2023 7:43 AM    LPN reviewed patients reported daily Remote Patient Monitoring metrics. All reported metrics are within normal limits. Plan/Follow Up: Will continue to review, monitor and address alerts with follow up based on severity of symptoms and risk factors.

## 2023-02-17 ENCOUNTER — PATIENT OUTREACH (OUTPATIENT)
Dept: CASE MANAGEMENT | Age: 68
End: 2023-02-17

## 2023-02-17 ENCOUNTER — APPOINTMENT (OUTPATIENT)
Dept: PHYSICAL THERAPY | Age: 68
End: 2023-02-17
Payer: MEDICARE

## 2023-02-17 NOTE — PROGRESS NOTES
Remote Patient Monitoring Note      Date/Time:  2/17/2023 7:58 AM    LPN reviewed patients reported daily Remote Patient Monitoring metrics. All reported metrics are within normal limits. --- Current Patient Metrics ---- Activity: - mins Blood Pressure: 126/78, 80bpm Pulseox: 92%, 88bpm Survey: - Weight: 177.4lbs Note Created at: 02/17/2023 07:59 AM ET ---- Time-Spent: 4 minutes 0 seconds   Plan/Follow Up: Will continue to review, monitor and address alerts with follow up based on severity of symptoms and risk factors.

## 2023-02-20 ENCOUNTER — PATIENT OUTREACH (OUTPATIENT)
Dept: CASE MANAGEMENT | Age: 68
End: 2023-02-20

## 2023-02-20 NOTE — PROGRESS NOTES
Remote Patient Monitoring Note      Date/Time:  2/20/2023 10:35 AM    LPN reviewed patients reported daily Remote Patient Monitoring metrics. All reported metrics are within normal limits. ---- Current Patient Metrics ---- Activity: - mins Blood Pressure: 138/73, 75bpm Pulseox: 98%, 68bpm Survey: - Weight: 177.2lbs Note Created at: 02/20/2023 10:35 AM ET ---- Time-Spent: 5 minutes 0 seconds    Plan/Follow Up: Will continue to review, monitor and address alerts with follow up based on severity of symptoms and risk factors.

## 2023-02-21 ENCOUNTER — PATIENT OUTREACH (OUTPATIENT)
Dept: CASE MANAGEMENT | Age: 68
End: 2023-02-21

## 2023-02-21 ENCOUNTER — HOSPITAL ENCOUNTER (OUTPATIENT)
Dept: PHYSICAL THERAPY | Age: 68
Discharge: HOME | End: 2023-02-21
Payer: MEDICARE

## 2023-02-21 PROCEDURE — 97110 THERAPEUTIC EXERCISES: CPT | Performed by: PHYSICAL THERAPIST

## 2023-02-21 PROCEDURE — 97140 MANUAL THERAPY 1/> REGIONS: CPT | Performed by: PHYSICAL THERAPIST

## 2023-02-21 NOTE — PROGRESS NOTES
Remote Patient Monitoring Note      Date/Time:  2/21/2023 7:38 AM    LPN reviewed patients reported daily Remote Patient Monitoring metrics. All reported metrics are within normal limits. Current Patient Metrics ---- Activity: - mins Blood Pressure: 148/81, 82bpm Pulseox: 96%, 88bpm Survey: - Weight: 177.4lbs  Plan/Follow Up: Will continue to review, monitor and address alerts with follow up based on severity of symptoms and risk factors.

## 2023-02-22 ENCOUNTER — PATIENT OUTREACH (OUTPATIENT)
Dept: CASE MANAGEMENT | Age: 68
End: 2023-02-22

## 2023-02-22 NOTE — PROGRESS NOTES
Remote Patient Monitoring Note      Date/Time:  2/22/2023 10:24 AM    LPN reviewed patients reported daily Remote Patient Monitoring metrics. All reported metrics are within normal limits. Current Patient Metrics ---- Activity: - mins Blood Pressure: 114/87, 79bpm Pulseox: 96%, 90bpm Survey: - Weight: 177.6lbs  Plan/Follow Up: Will continue to review, monitor and address alerts with follow up based on severity of symptoms and risk factors.

## 2023-02-23 ENCOUNTER — PATIENT OUTREACH (OUTPATIENT)
Dept: CASE MANAGEMENT | Age: 68
End: 2023-02-23

## 2023-02-23 ENCOUNTER — APPOINTMENT (OUTPATIENT)
Dept: PHYSICAL THERAPY | Age: 68
End: 2023-02-23
Payer: MEDICARE

## 2023-02-24 ENCOUNTER — PATIENT OUTREACH (OUTPATIENT)
Dept: CASE MANAGEMENT | Age: 68
End: 2023-02-24

## 2023-02-24 NOTE — PROGRESS NOTES
Ambulatory Care Management Note    Date/Time:  2/24/2023 12:41 PM  Patient Current Location: [place]    RPM Data for past 30 days (been in RPM program  for 68 days):                Remote Patient Monitoring Care Plan      Date/Time:  2/24/2023 1:03 PM    Patient is currently enrolled in the [facility] Patient Monitoring (RPM) program for in home monitoring for HTN. Patient will be monitoring activity, blood pressure , pulse ox , weight, survey questions, and disease specific education modules  daily. Interventions:  pt has appoint with PCP  on 2/28/23     Short Term Goal: Patient will engage in Remote Patient Monitoring each day to develop the skills necessary for self management. Long Term Goal: Patient will be able to complete self management skills daily for better disease management. Care Team Responsibilities:   Alerts will be reviewed daily and addressed within 2-4 hours during operational hours (Monday -Friday 9 am-4 pm)  Alert response and intervention documented in patient medical record   Patient monitored over  days  Discharge from program based on Self-Management Readiness assessment      Goals Addressed                   This Visit's Progress     Supportive resources in place to maintain patient in the community for HTN-remote patient monitoring DME equipment        02/24/23  Attempted to outreach with pt for CM/RPM follow up. LM x 2 to return this call. Martín Bocanegra, MSN, RN - Ambulatory Care Manager - [phone]     02/10/23  Pt unable to talk long, on her way to work  States she is feeling fine, taking meds as directed  BP has been 120s-140s/80s  Plan to follow up with pt in 2 weeks, pt sees PCP 2/28. Martín Bocanegra MSN, RN - Ambulatory Care Manager - [phone]     02/07/23   Attempted to outreach with pt for CM follow up. LM to return this call. -MLL  2:56 PM  Attempted to reach pt again. Will continue to monitor daily RPM metrics. -MLL    01/30/23  Discussed pt's BP readings, she has had some higher reading which she relates to lifestyle/busy schedule  Instructed pt to increase her Losartan to 50 mg per PCP  Discussed benefits of low sodium/no added salt diet. Discussed My Plate & will send her info  Discussed benefits of exercise (pt wants to lose 30 lbs, feel she has plateaued) & alternating different types of exercise - pt currently rides stationary bike 4 miles several times a week - suggested adding yoga/pilates/strength training  Plan to follow up with pt in 7-10 days.    Jayshree Sutherland, MSN, RN - Ambulatory Care Manager - [phone]     1/27/23-robertw  -spoke to pt's  (hipaa) and he asked that pt be called on her cell phone  -left message for pt on her cell number     1/26/23-robertw  -RPM care plan documented in progress note  -pt has been in the program for 39 days for HTN; VSS  -all RPM data was entered in progress note and sent to Dr Donny Luke  -left message on both phone numbers asking pt to call back  -will continue to monitor  -patient needs continued CM services and her case is transitioned to Jayshree Sutherland, RN, ACM)       12/15/22-shiv  -pt is currently enrolled in the [address] [place] [address] Patient Monitoring (RPM) program for in home monitoring for HTN and will be engaged in monitoring weight, activity, blood pressure, survey questions and disease specific education modules to develop the skills necessary for self management  -will review, monitor and address alerts with follow up based on severity of symptoms and risk factors

## 2023-02-24 NOTE — PROGRESS NOTES
Remote Patient Monitoring Note      Date/Time:  2/24/2023 12:03 PM    LPN reviewed patients reported daily Remote Patient Monitoring metrics. All reported metrics are within normal limits. Current Patient Metrics ---- Activity: - mins Blood Pressure: 116/77, 96bpm Pulseox: -%, -bpm Survey: - Weight: 177.6lbs  Plan/Follow Up: Will continue to review, monitor and address alerts with follow up based on severity of symptoms and risk factors.

## 2023-02-27 ENCOUNTER — PATIENT OUTREACH (OUTPATIENT)
Dept: CASE MANAGEMENT | Age: 68
End: 2023-02-27

## 2023-02-27 NOTE — PROGRESS NOTES
Remote Patient Monitoring Note      Date/Time:  2/27/2023 3:38 PM    LPN reviewed patients reported daily Remote Patient Monitoring metrics. All reported metrics are within normal limits. Current Patient Metrics ---- Activity: - mins Blood Pressure: -/-, -bpm Pulseox: 98%, 86bpm Survey: - Weight: 179.0lbs     Plan/Follow Up: Will continue to review, monitor and address alerts with follow up based on severity of symptoms and risk factors.

## 2023-02-28 ENCOUNTER — HOSPITAL ENCOUNTER (OUTPATIENT)
Dept: PHYSICAL THERAPY | Age: 68
Discharge: HOME | End: 2023-02-28
Payer: MEDICARE

## 2023-02-28 ENCOUNTER — OFFICE VISIT (OUTPATIENT)
Dept: INTERNAL MEDICINE CLINIC | Age: 68
End: 2023-02-28
Payer: MEDICARE

## 2023-02-28 ENCOUNTER — PATIENT OUTREACH (OUTPATIENT)
Dept: CASE MANAGEMENT | Age: 68
End: 2023-02-28

## 2023-02-28 VITALS
DIASTOLIC BLOOD PRESSURE: 74 MMHG | WEIGHT: 181.6 LBS | BODY MASS INDEX: 29.18 KG/M2 | HEART RATE: 79 BPM | OXYGEN SATURATION: 99 % | RESPIRATION RATE: 16 BRPM | TEMPERATURE: 98.8 F | HEIGHT: 66 IN | SYSTOLIC BLOOD PRESSURE: 127 MMHG

## 2023-02-28 DIAGNOSIS — Z12.11 SCREEN FOR COLON CANCER: ICD-10-CM

## 2023-02-28 DIAGNOSIS — I10 ESSENTIAL HYPERTENSION: Primary | ICD-10-CM

## 2023-02-28 DIAGNOSIS — Z71.89 ADVANCED DIRECTIVES, COUNSELING/DISCUSSION: ICD-10-CM

## 2023-02-28 PROCEDURE — G9899 SCRN MAM PERF RSLTS DOC: HCPCS | Performed by: STUDENT IN AN ORGANIZED HEALTH CARE EDUCATION/TRAINING PROGRAM

## 2023-02-28 PROCEDURE — 3017F COLORECTAL CA SCREEN DOC REV: CPT | Performed by: STUDENT IN AN ORGANIZED HEALTH CARE EDUCATION/TRAINING PROGRAM

## 2023-02-28 PROCEDURE — G8417 CALC BMI ABV UP PARAM F/U: HCPCS | Performed by: STUDENT IN AN ORGANIZED HEALTH CARE EDUCATION/TRAINING PROGRAM

## 2023-02-28 PROCEDURE — G8536 NO DOC ELDER MAL SCRN: HCPCS | Performed by: STUDENT IN AN ORGANIZED HEALTH CARE EDUCATION/TRAINING PROGRAM

## 2023-02-28 PROCEDURE — G8510 SCR DEP NEG, NO PLAN REQD: HCPCS | Performed by: STUDENT IN AN ORGANIZED HEALTH CARE EDUCATION/TRAINING PROGRAM

## 2023-02-28 PROCEDURE — G8399 PT W/DXA RESULTS DOCUMENT: HCPCS | Performed by: STUDENT IN AN ORGANIZED HEALTH CARE EDUCATION/TRAINING PROGRAM

## 2023-02-28 PROCEDURE — 97140 MANUAL THERAPY 1/> REGIONS: CPT

## 2023-02-28 PROCEDURE — 97110 THERAPEUTIC EXERCISES: CPT

## 2023-02-28 PROCEDURE — 1101F PT FALLS ASSESS-DOCD LE1/YR: CPT | Performed by: STUDENT IN AN ORGANIZED HEALTH CARE EDUCATION/TRAINING PROGRAM

## 2023-02-28 PROCEDURE — G8427 DOCREV CUR MEDS BY ELIG CLIN: HCPCS | Performed by: STUDENT IN AN ORGANIZED HEALTH CARE EDUCATION/TRAINING PROGRAM

## 2023-02-28 PROCEDURE — G0439 PPPS, SUBSEQ VISIT: HCPCS | Performed by: STUDENT IN AN ORGANIZED HEALTH CARE EDUCATION/TRAINING PROGRAM

## 2023-02-28 RX ORDER — SOD SULF/POT CHLORIDE/MAG SULF 1.479 G
TABLET ORAL
COMMUNITY
Start: 2023-02-01

## 2023-02-28 NOTE — PROGRESS NOTES
Deyvi Perez is a 74 y.o. female    Chief Complaint   Patient presents with    Follow-up       Visit Vitals  /74 (BP 1 Location: Left upper arm, BP Patient Position: Sitting, BP Cuff Size: Adult)   Pulse 79   Temp 98.8 °F (37.1 °C) (Oral)   Resp 16   Ht 5' 6\" (1.676 m)   Wt 181 lb 9.6 oz (82.4 kg)   SpO2 99%   BMI 29.31 kg/m²           1. Have you been to the ER, urgent care clinic since your last visit? Hospitalized since your last visit? NO    2. Have you seen or consulted any other health care providers outside of the [address] [place] [address] since your last visit? Include any pap smears or colon screening.  NO

## 2023-02-28 NOTE — PROGRESS NOTES
Good Help to Those in [facility]   Internal Medicine  [address] [facility] [address], [address] [facility]  [address]  [place], [address] [facility]  [phone]      Primary Care Visit Note    Assessment/Plan:       Right shoulder impingement  L knee OA  - evaluated by Dr. Bryant Strauss on 1/25/23 with PRN follow up. - currently undergoing PT    HTN - controlled with losartan 50 mg daily, monitoring w RPM    Left foot pain-   evaluated by Dr. Juan Miguel Flores, recommended alternate shoes w better arch support, which have been improving her pain. HM:  She declines all vaccines, counseled on the importance of them. - pt has colonoscopy on 3/13  Follow-up and Dispositions    Return in about 6 months (around 8/28/2023). Oleg Mehta MD    CC:     Chief Complaint   Patient presents with    Follow-up         HPI:     Nano Russo is a 74 y.o. female who presents for:       HTN:- she has been taking 50 mg of losartan daily. Pt is retired special  but currently working as an . Pt quit smoking more than 15 years. Drinks only rarely. Hearing has been relatively good, recently evaluated and she does not have current concerns. Discussed vaccines, does not want any today.      ROS:   Constitutional: negative for fevers, chills, anorexia and weight loss  Eyes:   negative for visual disturbance and irritation  ENT:   negative for tinnitus,sore throat,nasal congestion,ear pain,hoarseness  Respiratory:  negative for cough, hemoptysis, dyspnea,wheezing  CV:   negative for chest pain, palpitations, lower extremity edema  GI:   negative for nausea, vomiting, diarrhea, abdominal pain,melena  Genitourinary: negative for frequency, dysuria and hematuria  Musculoskel: negative for myalgias, arthralgias, back pain, muscle weakness, joint pain  Neurological:  negative for headaches, dizziness, focal weakness, numbness  Psychiatric:     Negative for depression or anxiety        Past Medical History: Active Ambulatory Problems     Diagnosis Date Noted    Ventral hernia 07/17/2013    Lipoma of back 07/17/2013    Post-menopause 12/13/2022    Left foot pain 12/13/2022    Chronic pain of left knee 12/13/2022    Primary hypertension 12/13/2022    Injury of right rotator cuff, initial encounter 12/13/2022     Resolved Ambulatory Problems     Diagnosis Date Noted    No Resolved Ambulatory Problems     Past Medical History:   Diagnosis Date    Ill-defined condition 8/7/15    Other ill-defined conditions(799.89)           Current Medications:     Current Outpatient Medications:     Sutab 1.479-0.188- 0.225 gram tab, , Disp: , Rfl:     losartan (COZAAR) 50 mg tablet, Take 1 Tablet by mouth daily. , Disp: 30 Tablet, Rfl: 1    diclofenac EC (VOLTAREN) 75 mg EC tablet, Take 1 Tablet by mouth two (2) times a day. (Patient not taking: No sig reported), Disp: 60 Tablet, Rfl: 0      Past Surgical History:     Past Surgical History:   Procedure Laterality Date    ENDOSCOPY, COLON, DIAGNOSTIC      2007    HX BREAST BIOPSY      4 benign breast surgeries-2 on each side    HX GYN  2007    partial hysterectomy. fibroid.     HX LIPOMA RESECTION  8/14/15    excision of lipomas right flank and right supraumbilical region    HX ORTHOPAEDIC Bilateral     BONE REMOVAL FROM INNER ASPECT BOTH FEET    HX OTHER SURGICAL      CYST EXCISIONS FROM BIRTH CANAL    HX WISDOM TEETH EXTRACTION  11/22/2020         Family History:     Family History   Problem Relation Age of Onset    Diabetes Mother     Heart Disease Mother     Anesth Problems Neg Hx          Social History:     Social History     Socioeconomic History    Marital status:      Spouse name: Not on file    Number of children: Not on file    Years of education: Not on file    Highest education level: Not on file   Occupational History    Not on file   Tobacco Use    Smoking status: Former     Packs/day: 1.00     Years: 6.00     Pack years: 6.00     Types: Cigarettes Quit date: 2002     Years since quittin.1    Smokeless tobacco: Never   Vaping Use    Vaping Use: Never used   Substance and Sexual Activity    Alcohol use: Yes     Alcohol/week: 4.0 standard drinks     Types: 4 Shots of liquor per week     Comment: social    Drug use: No    Sexual activity: Yes   Other Topics Concern    Not on file   Social History Narrative    Not on file     Social Determinants of Health     Financial Resource Strain: Medium Risk    Difficulty of Paying Living Expenses: Somewhat hard   Food Insecurity: No Food Insecurity    Worried About Running Out of Food in the Last Year: Never true    Ran Out of Food in the Last Year: Never true   Transportation Needs: No Transportation Needs    Lack of Transportation (Medical): No    Lack of Transportation (Non-Medical): No   Physical Activity: Insufficiently Active    Days of Exercise per Week: 3 days    Minutes of Exercise per Session: 40 min   Stress: No Stress Concern Present    Feeling of Stress :  Only a little   Social Connections: Not on file   Intimate Partner Violence: Not on file   Housing Stability: Low Risk     Unable to Pay for Housing in the Last Year: No    Number of [place] in the Last Year: 1    Unstable Housing in the Last Year: No            Visit Vitals  /74 (BP 1 Location: Left upper arm, BP Patient Position: Sitting, BP Cuff Size: Adult)   Pulse 79   Temp 98.8 °F (37.1 °C) (Oral)   Resp 16   Ht 5' 6\" (1.676 m)   Wt 181 lb 9.6 oz (82.4 kg)   SpO2 99%   BMI 29.31 kg/m²         Physical Exam:   General - Well appearing female  HEENT - PERRL, TM no erythema/opacification, normal nasal turbinates, no oropharyngeal erythema or exudate, MMM  Neck - supple, no thyroidomegaly, no lymphadenopathy  Pulm - clear to auscultation bilaterally  Cardio - RRR, normal S1 S2, no murmur  Abd - soft, nontender, no masses, no HSM  Extrem - no edema, +2 distal pulses  Neuro-  Alert and oriented, No focal deficits  MSK - pt unable to actively flex or abduct R shoulder fully, limited to 100 degrees. No limit of passive flexion abduction. Empty can test positive. Labs/Imaging: This is the Subsequent Medicare Annual Wellness Exam, performed 12 months or more after the Initial AWV or the last Subsequent AWV    I have reviewed the patient's medical history in detail and updated the computerized patient record. Assessment/Plan   Education and counseling provided:  Are appropriate based on today's review and evaluation  Pneumococcal Vaccine  Colorectal cancer screening tests    1. Essential hypertension  2. Advanced directives, counseling/discussion  -     FULL CODE  3. Screen for colon cancer       Depression Risk Factor Screening     3 most recent PHQ Screens 2/28/2023   Little interest or pleasure in doing things Not at all   Feeling down, depressed, irritable, or hopeless Not at all   Total Score PHQ 2 0       Alcohol & Drug Abuse Risk Screen    Do you average more than 1 drink per night or more than 7 drinks a week:  No    On any one occasion in the past three months have you have had more than 3 drinks containing alcohol:  No          Functional Ability and Level of Safety    Hearing: Hearing is good. Activities of Daily Living: The home contains: no safety equipment. Patient does total self care      Ambulation: with no difficulty     Fall Risk:  Fall Risk Assessment, last 12 mths 2/28/2023   Able to walk? Yes   Fall in past 12 months? 0   Do you feel unsteady?  0   Are you worried about falling 0         Health Maintenance Due     Health Maintenance Due   Topic Date Due    Hepatitis C Screening  Never done    COVID-19 Vaccine (1) Never done    DTaP/Tdap/Td series (1 - Tdap) Never done    Colorectal Cancer Screening Combo  Never done    Shingles Vaccine (1 of 2) Never done    Pneumococcal 65+ years (1 - PCV) Never done       Patient Care Team   Patient Care Team:  Mela Denver, MD as PCP - General (Internal Medicine Physician)  Christa Sauceda MD as PCP - [facility]  Vicki Espinoza, RN as Ambulatory Care Manager    History     Patient Active Problem List   Diagnosis Code    Ventral hernia K43.9    Lipoma of back D17.1    Post-menopause Z78.0    Left foot pain M79.672    Chronic pain of left knee M25.562, G89.29    Primary hypertension I10    Injury of right rotator cuff, initial encounter S46.001A     Past Medical History:   Diagnosis Date    Ill-defined condition 8/7/15    ALLERGIC REACTION CAUSING MANY DARK SPOTS MAINLY ON ARMS & LEGS, PT MEDICATING FOR ITCHING; DENSE SKIN-COLORED RASH ON UPPER BACK    Lipoma of back 7/17/2013    Other ill-defined conditions(799.89)     FIBROCYSTIC BREAST DISEASE; OTHER CYSTS      Past Surgical History:   Procedure Laterality Date    ENDOSCOPY, COLON, DIAGNOSTIC      2007    HX BREAST BIOPSY      4 benign breast surgeries-2 on each side    HX GYN  2007    partial hysterectomy. fibroid. HX LIPOMA RESECTION  8/14/15    excision of lipomas right flank and right supraumbilical region    HX ORTHOPAEDIC Bilateral     BONE REMOVAL FROM INNER ASPECT BOTH FEET    HX OTHER SURGICAL      CYST EXCISIONS FROM BIRTH CANAL    HX WISDOM TEETH EXTRACTION  11/22/2020     Current Outpatient Medications   Medication Sig Dispense Refill    Sutab 1.479-0.188- 0.225 gram tab       losartan (COZAAR) 50 mg tablet Take 1 Tablet by mouth daily. 30 Tablet 1    diclofenac EC (VOLTAREN) 75 mg EC tablet Take 1 Tablet by mouth two (2) times a day.  (Patient not taking: No sig reported) 60 Tablet 0     Allergies   Allergen Reactions    Other Food Other (comments)     POTATOES, BLUEBERRIES, CELERY, TOMATO PER ALLERGY TESTING (RECENT 8/7/15)    Iodine Hives    Other Medication Unknown (comments)     Seafood-nausea, diarrhea, pain    Sulfa (Sulfonamide Antibiotics) Swelling       Family History   Problem Relation Age of Onset    Diabetes Mother     Heart Disease Mother     Daija Louies Problems Neg Hx      Social History     Tobacco Use    Smoking status: Former     Packs/day: 1.00     Years: 6.00     Pack years: 6.00     Types: Cigarettes     Quit date: 2002     Years since quittin.1    Smokeless tobacco: Never   Substance Use Topics    Alcohol use:  Yes     Alcohol/week: 4.0 standard drinks     Types: 4 Shots of liquor per week     Comment: social Micki Petersen MD

## 2023-02-28 NOTE — PROGRESS NOTES
Remote Patient Monitoring Note      Date/Time:  2/28/2023 3:29 PM    LPN reviewed patients reported daily Remote Patient Monitoring metrics. All reported metrics are within normal limits. Current Patient Metrics ---- Activity: - mins Blood Pressure: -/-, -bpm Pulseox: 98%, 70bpm Survey: - Weight: 178.0lbs    Plan/Follow Up: Will continue to review, monitor and address alerts with follow up based on severity of symptoms and risk factors.

## 2023-02-28 NOTE — PROGRESS NOTES
PT DAILY TREATMENT NOTE - [facility] 2-15    Patient Name: Jayne Pitch  Date:2023  : 1955  [x]  Patient  Verified  Payor: Ronnie Gannon / Plan: [place] MEDICARE PART A & B / Product Type: Medicare /    In time:  10:00 AM  Out time:  10:57 AM   Total Treatment Time (min): 57 minutes  Total Timed Codes (min): 47 minutes  1:1 Treatment Time ( only): 31 minutes   Visit #:  12    Treatment Area: Pain in left knee [M25.562]  Pain in right shoulder [M25.511]    SUBJECTIVE  Pain Level (0-10 scale): 2/10  Any medication changes, allergies to medications, adverse drug reactions, diagnosis change, or new procedure performed?: [x] No    [] Yes (see summary sheet for update)  Subjective functional status/changes:   [] No changes reported  The Pt reports that she definitely \"overworked the shoulder\" last session and it took 2 days for the pain to dissipate. She reports compliance with some of her exercises at home. She continues to have pain with overhead reaching into high cabinets in her kitchen. OBJECTIVE    Modality rationale: decrease edema, decrease inflammation, and decrease pain to improve the patients ability to perform ADLs with less pain or discomfort.    Min Type Additional Details       [] Estim: []Att   []Unatt    []TENS instruct                  []IFC  []Premod   []NMES                     []Other:  []w/US   []w/ice   []w/heat  Position:  Location:       []  Traction: [] Cervical       []Lumbar                       [] Prone          []Supine                       []Intermittent   []Continuous Lbs:  [] before manual  [] after manual  []w/heat    []  Ultrasound: []Continuous   [] Pulsed                       at: []1MHz   []3MHz Location:  W/cm2:    [] Paraffin         Location:   []w/heat   10 [x]  Ice     []  Heat  []  Ice massage Position: Supine  Location: R shoulder    []  Laser  []  Other: Position:  Location:      []  Vasopneumatic Device Pressure:       [] lo [] med [] hi   Temperature:      [x] Skin assessment post-treatment:  [x]intact []redness- no adverse reaction    []redness - adverse reaction:     34 min Therapeutic Exercise:  [x] See flow sheet :   Rationale: increase ROM, increase strength, and increase proprioception to improve the patients ability to perform ADLs with less pain or discomfort. 13 min Manual Therapy: PROM with inf and post [address] mobs   MFR to UT/LS/pec/biceps musculature     Rationale: decrease pain, increase ROM, increase tissue extensibility, decrease trigger points, and increase postural awareness to improve the patients ability to perform ADLs with less pain or discomfort. With   [x] TE   [] TA   [] Neuro   [] SC   [] other: Patient Education: [x] Review HEP    [] Progressed/Changed HEP based on:   [] positioning   [] body mechanics   [] transfers   [] heat/ice application    [] other:      Other Objective/Functional Measures: None noted     Pain Level (0-10 scale) post treatment: 4/10    ASSESSMENT/Changes in Function:   Focused heavily on improving her scapular strength and stability. She did well with this, but did require frequent verbal cues to retract her scapulas and not UT hike. She tolerated the new exercises well, but did report some increased shoulder irritation. She was given ice at the end of the session that she said helped to reduce her discomfort. Patient will continue to benefit from skilled PT services to modify and progress therapeutic interventions, address functional mobility deficits, address ROM deficits, address strength deficits, analyze and address soft tissue restrictions, analyze and cue movement patterns, analyze and modify body mechanics/ergonomics, assess and modify postural abnormalities, and instruct in home and community integration to attain remaining goals. []  See Plan of Care  []  See progress note/recertification  []  See Discharge Summary         Progress towards goals / Updated goals:  Short Term Goals:  To be accomplished in 8-10 treatments:              Pt will be consistent and demonstrate I with HEP-intermittently met --- 2x/ week   Pt will complain of pain 1-2/10 with all activity-progressing   Pt will demonstrate improved ROM to complete all ADL's and household chores more efficiently-MET   Pt will be able to maintain positions for 30-45 min without increased symptoms-10-15 minutes     Long Term Goals:  To be accomplished in 15-20 treatments:              Pt will complain of pain 0-1/10 with all activity-progressing  Pt will increase strength to stabilize the core and shoulder to complete all activity through full ROM without increased symptoms-progressing  Pt will increase FOTO score by 9 points to meet MDC and demonstrate improved function with all activity-Progressing  Pt will return to exercise routine and get back into her walking program without increased symptoms-MET  Pt will use proper form and posture to complete all work tasks without increased symptoms 100% of the time-Progressing      PLAN  [x]  Upgrade activities as tolerated     [x]  Continue plan of care  [x]  Update interventions per flow sheet       []  Discharge due to:_  []  Other:_      Juan Osorio, PT 2/28/2023

## 2023-02-28 NOTE — PATIENT INSTRUCTIONS
Medicare Wellness Visit, Female     The best way to live healthy is to have a lifestyle where you eat a well-balanced diet, exercise regularly, limit alcohol use, and quit all forms of tobacco/nicotine, if applicable. Regular preventive services are another way to keep healthy. Preventive services (vaccines, screening tests, monitoring & exams) can help personalize your care plan, which helps you manage your own care. Screening tests can find health problems at the earliest stages, when they are easiest to treat. [facility] follows the current, evidence-based guidelines published by the [facility] ([facility]) when recommending preventive services for our patients. Because we follow these guidelines, sometimes recommendations change over time as research supports it. (For example, mammograms used to be recommended annually. Even though Medicare will still pay for an annual mammogram, the newer guidelines recommend a mammogram every two years for women of average risk). Of course, you and your doctor may decide to screen more often for some diseases, based on your risk and your co-morbidities (chronic disease you are already diagnosed with). Preventive services for you include:  - Medicare offers their members a free annual wellness visit, which is time for you and your primary care provider to discuss and plan for your preventive service needs.  Take advantage of this benefit every year!    -Over the age of 72 should receive the recommended pneumonia vaccines.    -All adults should have a flu vaccine yearly.  -All adults should have a tetanus vaccine every 10 years.   -Over the age 48 should receive the shingles vaccines.        -All adults should be screened once for Hepatitis C.  -All adults age 38-68 who are overweight should have a diabetes screening test once every three years.   -Other screening tests and preventive services for persons with diabetes include: an eye exam to screen for diabetic retinopathy, a kidney function test, a foot exam, and stricter control over your cholesterol.   -Cardiovascular screening for adults with routine risk involves an electrocardiogram (ECG) at intervals determined by your doctor.     -Colorectal cancer screenings should be done for adults age 39-70 with no increased risk factors for colorectal cancer. There are a number of acceptable methods of screening for this type of cancer. Each test has its own benefits and drawbacks. Discuss with your doctor what is most appropriate for you during your annual wellness visit. The different tests include: colonoscopy (considered the best screening method), a fecal occult blood test, a fecal DNA test, and sigmoidoscopy.    -Lung cancer screening is recommended annually with a low dose CT scan for adults between age 54 and 68, who have smoked at least 30 pack years (equivalent of 1 pack per day for 30 days), and who is a current smoker or quit less than 15 years ago.    -A bone mass density test is recommended when a woman turns 65 to screen for osteoporosis. This test is only recommended one time, as a screening. Some providers will use this same test as a disease monitoring tool if you already have osteoporosis. -Breast cancer screenings are recommended every other year for women of normal risk, age 54-69.    -Cervical cancer screenings for women over age 72 are only recommended with certain risk factors.      Here is a list of your current Health Maintenance items (your personalized list of preventive services) with a due date:  Health Maintenance Due   Topic Date Due    Hepatitis C Test  Never done    COVID-19 Vaccine (1) Never done    DTaP/Tdap/Td  (1 - Tdap) Never done    Colorectal Screening  Never done    Shingles Vaccine (1 of 2) Never done    Pneumococcal Vaccine (1 - PCV) Never done

## 2023-03-01 ENCOUNTER — PATIENT OUTREACH (OUTPATIENT)
Dept: CASE MANAGEMENT | Age: 68
End: 2023-03-01

## 2023-03-01 NOTE — PROGRESS NOTES
Remote Patient Monitoring Note      Date/Time:  3/1/2023 9:47 AM    LPN reviewed patients reported daily Remote Patient Monitoring metrics. All reported metrics are within normal limits. Current Patient Metrics ---- Activity: - mins Blood Pressure: 124/85, 74bpm Pulseox: 98%, 78bpm Survey: - Weight: 179.2lbs   Plan/Follow Up: Will continue to review, monitor and address alerts with follow up based on severity of symptoms and risk factors.

## 2023-03-02 ENCOUNTER — HOSPITAL ENCOUNTER (OUTPATIENT)
Dept: PHYSICAL THERAPY | Age: 68
Discharge: HOME | End: 2023-03-02
Payer: MEDICARE

## 2023-03-02 ENCOUNTER — PATIENT OUTREACH (OUTPATIENT)
Dept: CASE MANAGEMENT | Age: 68
End: 2023-03-02

## 2023-03-02 PROCEDURE — 97110 THERAPEUTIC EXERCISES: CPT | Performed by: PHYSICAL THERAPIST

## 2023-03-02 PROCEDURE — 97140 MANUAL THERAPY 1/> REGIONS: CPT | Performed by: PHYSICAL THERAPIST

## 2023-03-02 NOTE — PROGRESS NOTES
Remote Patient Monitoring Note      Date/Time:  3/2/2023 9:34 AM    LPN reviewed patients reported daily Remote Patient Monitoring metrics. All reported metrics are within normal limits. Current Patient Metrics ---- Activity: - mins Blood Pressure: 131/88, 79bpm Pulseox: 97%, 87bpm Survey: - Weight: 180.2lbs     Plan/Follow Up: Will continue to review, monitor and address alerts with follow up based on severity of symptoms and risk factors.

## 2023-03-03 ENCOUNTER — PATIENT OUTREACH (OUTPATIENT)
Dept: CASE MANAGEMENT | Age: 68
End: 2023-03-03

## 2023-03-03 ENCOUNTER — TELEPHONE (OUTPATIENT)
Dept: INTERNAL MEDICINE CLINIC | Age: 68
End: 2023-03-03

## 2023-03-03 NOTE — TELEPHONE ENCOUNTER
Called and spoke to patient . Informed patient she needs to call her GI doctor. She will call them .

## 2023-03-03 NOTE — TELEPHONE ENCOUNTER
States GI prep stuff is too expensive    States needs to know what another option is for the prep med    Please call to advise (or provide the gi dr info)

## 2023-03-03 NOTE — PROGRESS NOTES
Remote Patient Monitoring Note      Date/Time:  3/3/2023 10:24 AM    LPN reviewed patients reported daily Remote Patient Monitoring metrics. All reported metrics are within normal limits. Current Patient Metrics ---- Activity: - mins Blood Pressure: 160/96, 67bpm Pulseox: 98%, 70bpm Survey: - Weight: 178.4lbs    Plan/Follow Up: Will continue to review, monitor and address alerts with follow up based on severity of symptoms and risk factors.

## 2023-03-06 ENCOUNTER — PATIENT OUTREACH (OUTPATIENT)
Dept: CASE MANAGEMENT | Age: 68
End: 2023-03-06

## 2023-03-06 NOTE — PROGRESS NOTES
Remote Patient Monitoring Note      Date/Time:  3/6/2023 7:37 AM    LPN reviewed patients reported daily Remote Patient Monitoring metrics. All reported metrics are within normal limits. ---- Current Patient Metrics ---- Activity: - mins Blood Pressure: 135/90, 73bpm Pulseox: 98%, 72bpm Survey: - Weight: 178.6lbs Note Created at: 03/06/2023 07:38 AM ET ---- Time-Spent: 5 minutes 0 seconds  Plan/Follow Up: Will continue to review, monitor and address alerts with follow up based on severity of symptoms and risk factors.

## 2023-03-07 ENCOUNTER — PATIENT OUTREACH (OUTPATIENT)
Dept: CASE MANAGEMENT | Age: 68
End: 2023-03-07

## 2023-03-07 ENCOUNTER — HOSPITAL ENCOUNTER (OUTPATIENT)
Dept: PHYSICAL THERAPY | Age: 68
Discharge: HOME | End: 2023-03-07
Payer: MEDICARE

## 2023-03-07 PROCEDURE — 97110 THERAPEUTIC EXERCISES: CPT | Performed by: PHYSICAL THERAPIST

## 2023-03-07 PROCEDURE — 97140 MANUAL THERAPY 1/> REGIONS: CPT | Performed by: PHYSICAL THERAPIST

## 2023-03-07 NOTE — PROGRESS NOTES
Remote Patient Monitoring Note      Date/Time:  3/7/2023 3:09 PM    LPN reviewed patients reported daily Remote Patient Monitoring metrics. All reported metrics are within normal limits. ---- Current Patient Metrics ---- Activity: - mins Blood Pressure: -/-, -bpm Pulseox: 97%, 89bpm Survey: - Weight: 178.0lbs Note Created at: 03/07/2023 03:10 PM ET ---- Time-Spent: 5 minutes 0 seconds  Plan/Follow Up: Will continue to review, monitor and address alerts with follow up based on severity of symptoms and risk factors.

## 2023-03-08 ENCOUNTER — PATIENT OUTREACH (OUTPATIENT)
Dept: CASE MANAGEMENT | Age: 68
End: 2023-03-08

## 2023-03-08 NOTE — PROGRESS NOTES
Remote Patient Monitoring Note      Date/Time:  3/8/2023 10:28 AM    LPN reviewed patients reported daily Remote Patient Monitoring metrics. All reported metrics are within normal limits. ---- Current Patient Metrics ---- Activity: - mins Blood Pressure: 136/86, 77bpm Pulseox: 99%, 66bpm Survey: - Weight: 177.8lbs Note Created at: 03/08/2023 10:29 AM ET ---- Time-Spent: 5 minutes 0 seconds  Plan/Follow Up: Will continue to review, monitor and address alerts with follow up based on severity of symptoms and risk factors.

## 2023-03-09 ENCOUNTER — HOSPITAL ENCOUNTER (OUTPATIENT)
Dept: PHYSICAL THERAPY | Age: 68
Discharge: HOME | End: 2023-03-09
Payer: MEDICARE

## 2023-03-09 ENCOUNTER — PATIENT OUTREACH (OUTPATIENT)
Dept: CASE MANAGEMENT | Age: 68
End: 2023-03-09

## 2023-03-09 PROCEDURE — 97140 MANUAL THERAPY 1/> REGIONS: CPT | Performed by: PHYSICAL THERAPIST

## 2023-03-09 PROCEDURE — 97110 THERAPEUTIC EXERCISES: CPT | Performed by: PHYSICAL THERAPIST

## 2023-03-09 NOTE — PROGRESS NOTES
Remote Patient Monitoring Note      Date/Time:  3/9/2023 9:50 AM    LPN reviewed patients reported daily Remote Patient Monitoring metrics. All reported metrics are within normal limits. ---- Current Patient Metrics ---- Activity: - mins Blood Pressure: 128/78, 86bpm Pulseox: 97%, 76bpm Survey: - Weight: 178.2lbs Note Created at: 03/09/2023 09:51 AM ET ---- Time-Spent: 5 minutes 0 seconds  Plan/Follow Up: Will continue to review, monitor and address alerts with follow up based on severity of symptoms and risk factors.

## 2023-03-10 ENCOUNTER — PATIENT OUTREACH (OUTPATIENT)
Dept: CASE MANAGEMENT | Age: 68
End: 2023-03-10

## 2023-03-10 NOTE — PROGRESS NOTES
Remote Patient Monitoring Note      Date/Time:  3/10/2023 7:59 AM    LPN reviewed patients reported daily Remote Patient Monitoring metrics. All reported metrics are within normal limits. ---- Current Patient Metrics ---- Activity: - mins Blood Pressure: 110/78, 78bpm Pulseox: 98%, 77bpm Survey: - Weight: 178.6lbs Note Created at: 03/10/2023 08:00 AM ET ---- Time-Spent: 5 minutes 0 seconds    Plan/Follow Up: Will continue to review, monitor and address alerts with follow up based on severity of symptoms and risk factors.

## 2023-03-13 ENCOUNTER — HOSPITAL ENCOUNTER (OUTPATIENT)
Age: 68
Setting detail: OUTPATIENT SURGERY
Discharge: HOME | End: 2023-03-13
Attending: INTERNAL MEDICINE | Admitting: INTERNAL MEDICINE
Payer: MEDICARE

## 2023-03-13 ENCOUNTER — ANESTHESIA (OUTPATIENT)
Dept: ENDOSCOPY | Age: 68
End: 2023-03-13
Payer: MEDICARE

## 2023-03-13 ENCOUNTER — ANESTHESIA EVENT (OUTPATIENT)
Dept: ENDOSCOPY | Age: 68
End: 2023-03-13
Payer: MEDICARE

## 2023-03-13 ENCOUNTER — PATIENT OUTREACH (OUTPATIENT)
Dept: CASE MANAGEMENT | Age: 68
End: 2023-03-13

## 2023-03-13 VITALS
WEIGHT: 171 LBS | HEART RATE: 66 BPM | SYSTOLIC BLOOD PRESSURE: 122 MMHG | TEMPERATURE: 98.4 F | HEIGHT: 66 IN | RESPIRATION RATE: 20 BRPM | DIASTOLIC BLOOD PRESSURE: 75 MMHG | OXYGEN SATURATION: 100 % | BODY MASS INDEX: 27.48 KG/M2

## 2023-03-13 PROCEDURE — 74011250637 HC RX REV CODE- 250/637: Performed by: INTERNAL MEDICINE

## 2023-03-13 PROCEDURE — 74011000250 HC RX REV CODE- 250: Performed by: NURSE ANESTHETIST, CERTIFIED REGISTERED

## 2023-03-13 PROCEDURE — 74011250636 HC RX REV CODE- 250/636: Performed by: NURSE ANESTHETIST, CERTIFIED REGISTERED

## 2023-03-13 PROCEDURE — 2709999900 HC NON-CHARGEABLE SUPPLY: Performed by: INTERNAL MEDICINE

## 2023-03-13 PROCEDURE — 76060000031 HC ANESTHESIA FIRST 0.5 HR: Performed by: INTERNAL MEDICINE

## 2023-03-13 PROCEDURE — 77030013992 HC SNR POLYP ENDOSC BSC -B: Performed by: INTERNAL MEDICINE

## 2023-03-13 PROCEDURE — 76040000019: Performed by: INTERNAL MEDICINE

## 2023-03-13 PROCEDURE — 74011250636 HC RX REV CODE- 250/636: Performed by: INTERNAL MEDICINE

## 2023-03-13 PROCEDURE — 88305 TISSUE EXAM BY PATHOLOGIST: CPT

## 2023-03-13 RX ORDER — ATROPINE SULFATE 0.1 MG/ML
0.5 INJECTION INTRAVENOUS
Status: DISCONTINUED | OUTPATIENT
Start: 2023-03-13 | End: 2023-03-13 | Stop reason: HOSPADM

## 2023-03-13 RX ORDER — NALOXONE HYDROCHLORIDE 0.4 MG/ML
0.4 INJECTION, SOLUTION INTRAMUSCULAR; INTRAVENOUS; SUBCUTANEOUS
Status: DISCONTINUED | OUTPATIENT
Start: 2023-03-13 | End: 2023-03-13 | Stop reason: HOSPADM

## 2023-03-13 RX ORDER — FLUMAZENIL 0.1 MG/ML
0.2 INJECTION INTRAVENOUS
Status: DISCONTINUED | OUTPATIENT
Start: 2023-03-13 | End: 2023-03-13 | Stop reason: HOSPADM

## 2023-03-13 RX ORDER — MIDAZOLAM HYDROCHLORIDE 1 MG/ML
.25-5 INJECTION, SOLUTION INTRAMUSCULAR; INTRAVENOUS
Status: DISCONTINUED | OUTPATIENT
Start: 2023-03-13 | End: 2023-03-13 | Stop reason: HOSPADM

## 2023-03-13 RX ORDER — EPINEPHRINE 0.1 MG/ML
1 INJECTION INTRACARDIAC; INTRAVENOUS
Status: DISCONTINUED | OUTPATIENT
Start: 2023-03-13 | End: 2023-03-13 | Stop reason: HOSPADM

## 2023-03-13 RX ORDER — PROPOFOL 10 MG/ML
INJECTION, EMULSION INTRAVENOUS AS NEEDED
Status: DISCONTINUED | OUTPATIENT
Start: 2023-03-13 | End: 2023-03-13 | Stop reason: HOSPADM

## 2023-03-13 RX ORDER — SODIUM CHLORIDE 9 MG/ML
75 INJECTION, SOLUTION INTRAVENOUS CONTINUOUS
Status: DISCONTINUED | OUTPATIENT
Start: 2023-03-13 | End: 2023-03-13 | Stop reason: HOSPADM

## 2023-03-13 RX ORDER — SODIUM CHLORIDE 0.9 % (FLUSH) 0.9 %
5-40 SYRINGE (ML) INJECTION EVERY 8 HOURS
Status: DISCONTINUED | OUTPATIENT
Start: 2023-03-13 | End: 2023-03-13 | Stop reason: HOSPADM

## 2023-03-13 RX ORDER — FENTANYL CITRATE 50 UG/ML
12.5-2 INJECTION, SOLUTION INTRAMUSCULAR; INTRAVENOUS
Status: DISCONTINUED | OUTPATIENT
Start: 2023-03-13 | End: 2023-03-13 | Stop reason: HOSPADM

## 2023-03-13 RX ORDER — SODIUM CHLORIDE 9 MG/ML
INJECTION, SOLUTION INTRAVENOUS
Status: DISCONTINUED | OUTPATIENT
Start: 2023-03-13 | End: 2023-03-13 | Stop reason: HOSPADM

## 2023-03-13 RX ORDER — DEXTROMETHORPHAN/PSEUDOEPHED 2.5-7.5/.8
1.2 DROPS ORAL
Status: DISCONTINUED | OUTPATIENT
Start: 2023-03-13 | End: 2023-03-13 | Stop reason: HOSPADM

## 2023-03-13 RX ORDER — LIDOCAINE HYDROCHLORIDE 20 MG/ML
INJECTION, SOLUTION EPIDURAL; INFILTRATION; INTRACAUDAL; PERINEURAL AS NEEDED
Status: DISCONTINUED | OUTPATIENT
Start: 2023-03-13 | End: 2023-03-13 | Stop reason: HOSPADM

## 2023-03-13 RX ORDER — SODIUM CHLORIDE 0.9 % (FLUSH) 0.9 %
5-40 SYRINGE (ML) INJECTION AS NEEDED
Status: DISCONTINUED | OUTPATIENT
Start: 2023-03-13 | End: 2023-03-13 | Stop reason: HOSPADM

## 2023-03-13 RX ADMIN — PROPOFOL 20 MG: 10 INJECTION, EMULSION INTRAVENOUS at 16:52

## 2023-03-13 RX ADMIN — LIDOCAINE HYDROCHLORIDE 100 MG: 20 INJECTION, SOLUTION EPIDURAL; INFILTRATION; INTRACAUDAL; PERINEURAL at 16:44

## 2023-03-13 RX ADMIN — PROPOFOL 20 MG: 10 INJECTION, EMULSION INTRAVENOUS at 17:00

## 2023-03-13 RX ADMIN — PROPOFOL 20 MG: 10 INJECTION, EMULSION INTRAVENOUS at 16:54

## 2023-03-13 RX ADMIN — PROPOFOL 20 MG: 10 INJECTION, EMULSION INTRAVENOUS at 16:47

## 2023-03-13 RX ADMIN — PROPOFOL 20 MG: 10 INJECTION, EMULSION INTRAVENOUS at 16:49

## 2023-03-13 RX ADMIN — PROPOFOL 20 MG: 10 INJECTION, EMULSION INTRAVENOUS at 16:53

## 2023-03-13 RX ADMIN — PROPOFOL 20 MG: 10 INJECTION, EMULSION INTRAVENOUS at 16:48

## 2023-03-13 RX ADMIN — PROPOFOL 20 MG: 10 INJECTION, EMULSION INTRAVENOUS at 16:58

## 2023-03-13 RX ADMIN — PROPOFOL 20 MG: 10 INJECTION, EMULSION INTRAVENOUS at 16:45

## 2023-03-13 RX ADMIN — PROPOFOL 20 MG: 10 INJECTION, EMULSION INTRAVENOUS at 16:57

## 2023-03-13 RX ADMIN — PROPOFOL 80 MG: 10 INJECTION, EMULSION INTRAVENOUS at 16:44

## 2023-03-13 RX ADMIN — SODIUM CHLORIDE: 900 INJECTION, SOLUTION INTRAVENOUS at 16:42

## 2023-03-13 RX ADMIN — PROPOFOL 20 MG: 10 INJECTION, EMULSION INTRAVENOUS at 16:59

## 2023-03-13 RX ADMIN — PROPOFOL 20 MG: 10 INJECTION, EMULSION INTRAVENOUS at 16:51

## 2023-03-13 RX ADMIN — PROPOFOL 20 MG: 10 INJECTION, EMULSION INTRAVENOUS at 16:56

## 2023-03-13 RX ADMIN — PROPOFOL 20 MG: 10 INJECTION, EMULSION INTRAVENOUS at 16:50

## 2023-03-13 RX ADMIN — PROPOFOL 20 MG: 10 INJECTION, EMULSION INTRAVENOUS at 16:55

## 2023-03-13 RX ADMIN — PROPOFOL 20 MG: 10 INJECTION, EMULSION INTRAVENOUS at 16:46

## 2023-03-13 NOTE — ANESTHESIA PREPROCEDURE EVALUATION
Relevant Problems   No relevant active problems       Anesthetic History   No history of anesthetic complications            Review of Systems / Medical History  Patient summary reviewed, nursing notes reviewed and pertinent labs reviewed    Pulmonary  Within defined limits                 Neuro/Psych   Within defined limits           Cardiovascular  Within defined limits  Hypertension              Exercise tolerance: >4 METS     GI/Hepatic/Renal  Within defined limits              Endo/Other  Within defined limits           Other Findings              Physical Exam    Airway  Mallampati: II  TM Distance: > 6 cm  Neck ROM: normal range of motion   Mouth opening: Normal     Cardiovascular  Regular rate and rhythm,  S1 and S2 normal,  no murmur, click, rub, or gallop             Dental  No notable dental hx       Pulmonary  Breath sounds clear to auscultation               Abdominal  GI exam deferred       Other Findings            Anesthetic Plan    ASA: 2  Anesthesia type: MAC          Induction: Intravenous  Anesthetic plan and risks discussed with: Patient

## 2023-03-13 NOTE — H&P
[address]  [address]  [phone]        History and Physical     NAME: Matias Montano   :  1955   MRN:  [id number]         HPI:  Matias Montano is a 74 y.o. female here for surveillance colonoscopy. Patient had few polyps in 2017. No family h/o colon cancer. Past Surgical History:   Procedure Laterality Date    ENDOSCOPY, COLON, DIAGNOSTIC          HX BREAST BIOPSY      4 benign breast surgeries-2 on each side    HX GYN  2007    partial hysterectomy. fibroid. HX LIPOMA RESECTION  8/14/15    excision of lipomas right flank and right supraumbilical region    HX ORTHOPAEDIC Bilateral     BONE REMOVAL FROM INNER ASPECT BOTH FEET    HX OTHER SURGICAL      CYST EXCISIONS FROM BIRTH CANAL    HX WISDOM TEETH EXTRACTION  2020     Past Medical History:   Diagnosis Date    Hypertension     Ill-defined condition 2015    ALLERGIC REACTION CAUSING MANY DARK SPOTS MAINLY ON ARMS & LEGS, PT MEDICATING FOR ITCHING; DENSE SKIN-COLORED RASH ON UPPER BACK    Lipoma of back 2013    Other ill-defined conditions(799.89)     FIBROCYSTIC BREAST DISEASE; OTHER CYSTS     Social History     Tobacco Use    Smoking status: Former     Packs/day: 1.00     Years: 6.00     Pack years: 6.00     Types: Cigarettes     Quit date: 2002     Years since quittin.2    Smokeless tobacco: Never   Vaping Use    Vaping Use: Never used   Substance Use Topics    Alcohol use: Yes     Alcohol/week: 4.0 standard drinks     Types: 4 Shots of liquor per week     Comment: social    Drug use: No     No current facility-administered medications on file prior to encounter. Current Outpatient Medications on File Prior to Encounter   Medication Sig Dispense Refill    losartan (COZAAR) 50 mg tablet Take 1 Tablet by mouth daily. 30 Tablet 1    diclofenac EC (VOLTAREN) 75 mg EC tablet Take 1 Tablet by mouth two (2) times a day.  (Patient not taking: No sig reported) 60 Tablet 0     Allergies   Allergen Reactions    Other Food Other (comments)     POTATOES, BLUEBERRIES, CELERY, TOMATO PER ALLERGY TESTING (RECENT 8/7/15)    Iodine Hives    Other Medication Unknown (comments)     Seafood-nausea, diarrhea, pain    Sulfa (Sulfonamide Antibiotics) Swelling     Family History   Problem Relation Age of Onset    Diabetes Mother     Heart Disease Mother     Anesth Problems Neg Hx      Current Facility-Administered Medications   Medication Dose Route Frequency    0.9% sodium chloride infusion  75 mL/hr IntraVENous CONTINUOUS    sodium chloride (NS) flush 5-40 mL  5-40 mL IntraVENous Q8H    sodium chloride (NS) flush 5-40 mL  5-40 mL IntraVENous PRN    midazolam (VERSED) injection 0.25-5 mg  0.25-5 mg IntraVENous Multiple    fentaNYL citrate (PF) injection 12.5-200 mcg  12.5-200 mcg IntraVENous Multiple    naloxone (NARCAN) injection 0.4 mg  0.4 mg IntraVENous Multiple    flumazeniL (ROMAZICON) 0.1 mg/mL injection 0.2 mg  0.2 mg IntraVENous Multiple    simethicone (MYLICON) 63PL/9.2OI oral drops 80 mg  1.2 mL Oral Multiple    atropine injection 0.5 mg  0.5 mg IntraVENous ONCE PRN    EPINEPHrine (ADRENALIN) 0.1 mg/mL syringe 1 mg  1 mg Endoscopically ONCE PRN       PHYSICAL EXAM:    BP (!) 167/72   Pulse 61   Temp 97.6 °F (36.4 °C)   Resp 16   Ht 5' 6\" (1.676 m)   Wt 77.6 kg (171 lb)   SpO2 97%   Breastfeeding No   BMI 27.60 kg/m²      General: WD, WN. Alert, cooperative, no acute distress    HEENT: NC, Atraumatic. PERRLA, EOMI. Anicteric sclerae. Lungs:  CTA Bilaterally. No Wheezing/Rhonchi/Rales. Heart:  Regular  rhythm,  No murmur, No Rubs, No Gallops  Abdomen: Soft, Non distended, Non tender. +Bowel sounds, no HSM  Extremities: No c/c/e  Neurologic:  CN 2-12 gi, Alert and oriented X 3. No acute neurological distress   Psych:   Good insight. Not anxious nor agitated.        Assessment:   Personal h/o colon polyps, last colonoscopy 2017    Plan:   Endoscopic procedure: Colonoscopy  Anesthesia plan: Monitored Anesthesia Care

## 2023-03-13 NOTE — DISCHARGE INSTRUCTIONS
[address]  [address]  [phone]  1955    COLON DISCHARGE INSTRUCTIONS    DISCOMFORT:  Redness at IV site- apply warm compress to area; if redness or soreness persist- contact your physician  There may be a slight amount of blood passed from the rectum  Gaseous discomfort- walking, belching will help relieve any discomfort    DIET:   Regular diet. ACTIVITY:  You may resume your normal daily activities it is recommended that you spend the remainder of the day resting -  avoid any strenuous activity. You may not operate a vehicle for 12 hours  You may not engage in an occupation involving machinery or appliances for rest of today  You may not drink alcoholic beverages for at least 12 hours  Avoid making any critical decisions for at least 24 hour    CALL M.D. ANY SIGN OF:   Increasing pain, nausea, vomiting  Abdominal distension (swelling)  New increased bleeding (oral or rectal)  Fever (chills)  Pain in chest area  Bloody discharge from nose or mouth  Shortness of breath     Follow-up Instructions:   Call Dr. Narcisa Núñez for any questions or problems. Telephone # [phone]  Biopsy results will be available in  5 to 7 days    Impression:  -Total 7 polyps, 3-10 mm, found in the ascending and sigmoid colon, removed and sent for pathology  -Otherwise normal colon    Recommendations:   -Resume normal medication(s). -Regular diet.  -Await pathology. -If adenoma is present, repeat colonoscopy in 3 years.  -Follow up with primary care physician. Narcisa Núñez MD         Colon Polyps: Care Instructions  Your Care Instructions     Colon polyps are growths in the colon or the rectum. The cause of most colon polyps is not known, and most people who get them do not have any problems. But a certain kind can turn into cancer. For this reason, regular testing for colon polyps is important for people as they get older.  It is also important for anyone who has an increased risk for colon cancer. Polyps are usually found through routine colon cancer screening tests. Although most colon polyps are not cancerous, they are usually removed and then tested for cancer. Screening for colon cancer saves lives because the cancer can usually be cured if it is caught early. If you have a polyp that is the type that can turn into cancer, you may need more tests to examine your entire colon. The doctor will remove any other polyps that are found, and you will be tested more often. Follow-up care is a key part of your treatment and safety. Be sure to make and go to all appointments, and call your doctor if you are having problems. It's also a good idea to know your test results and keep a list of the medicines you take. How can you care for yourself at home? Regular exams to look for colon polyps are the best way to prevent polyps from turning into colon cancer. These can include stool tests, sigmoidoscopy, colonoscopy, and CT colonography. Talk with your doctor about a testing schedule that is right for you. To prevent polyps  There is no home treatment that can prevent colon polyps. But these steps may help lower your risk for cancer. Stay active. Being active can help you get to and stay at a healthy weight. Try to exercise on most days of the week. Walking is a good choice. Eat well. Choose a variety of vegetables, fruits, legumes (such as peas and beans), fish, poultry, and whole grains. Do not smoke. If you need help quitting, talk to your doctor about stop-smoking programs and medicines. These can increase your chances of quitting for good. If you drink alcohol, limit how much you drink. Limit alcohol to 2 drinks a day for men and 1 drink a day for women. When should you call for help? Call your doctor now or seek immediate medical care if:    You have severe belly pain. Your stools are maroon or very bloody.    Watch closely for changes in your health, and be sure to contact your doctor if:    You have a fever. You have nausea or vomiting. You have a change in bowel habits (new constipation or diarrhea). Your symptoms get worse or are not improving as expected. Where can you learn more? Go to http://www.mario.com/  Enter C571 in the search box to learn more about \"Colon Polyps: Care Instructions. \"  Current as of: June 6, 2022               Content Version: 13.4  © 2191-8415 [employer]. Care instructions adapted under license by [employer] (which disclaims liability or warranty for this information). If you have questions about a medical condition or this instruction, always ask your healthcare professional. [address] any warranty or liability for your use of this information. Learning About Coronavirus [phone])  Coronavirus [phone]): Overview  What is coronavirus (COVID-19)? The coronavirus disease (COVID-19) is caused by a virus. It is an illness that was first found in [place], [place], in December 2019. It has since spread worldwide. The virus can cause fever, cough, and trouble breathing. In severe cases, it can cause pneumonia and make it hard to breathe without help. It can cause death. Coronaviruses are a large group of viruses. They cause the common cold. They also cause more serious illnesses like Middle East respiratory syndrome (MERS) and severe acute respiratory syndrome (SARS). COVID-19 is caused by a novel coronavirus. That means it's a new type that has not been seen in people before. This virus spreads person-to-person through droplets from coughing and sneezing. It can also spread when you are close to someone who is infected. And it can spread when you touch something that has the virus on it, such as a doorknob or a tabletop. What can you do to protect yourself from coronavirus (COVID-19)?   The best way to protect yourself from getting sick is to: Avoid areas where there is an outbreak. Avoid contact with people who may be infected. Wash your hands often with soap or alcohol-based hand sanitizers. Avoid crowds and try to stay at least 6 feet away from other people. Wash your hands often, especially after you cough or sneeze. Use soap and water, and scrub for at least 20 seconds. If soap and water aren't available, use an alcohol-based hand . Avoid touching your mouth, nose, and eyes. What can you do to avoid spreading the virus to others? To help avoid spreading the virus to others:  Cover your mouth with a tissue when you cough or sneeze. Then throw the tissue in the trash. Use a disinfectant to clean things that you touch often. Stay home if you are sick or have been exposed to the virus. Don't go to school, work, or public areas. And don't use public transportation. If you are sick:  Leave your home only if you need to get medical care. But call the doctor's office first so they know you're coming. And wear a face mask, if you have one. If you have a face mask, wear it whenever you're around other people. It can help stop the spread of the virus when you cough or sneeze. Clean and disinfect your home every day. Use household  and disinfectant wipes or sprays. Take special care to clean things that you grab with your hands. These include doorknobs, remote controls, phones, and handles on your refrigerator and microwave. And don't forget countertops, tabletops, bathrooms, and computer keyboards. When to call for help  Call 911 anytime you think you may need emergency care. For example, call if:  You have severe trouble breathing. (You can't talk at all.)  You have constant chest pain or pressure. You are severely dizzy or lightheaded. You are confused or can't think clearly. Your face and lips have a blue color. You pass out (lose consciousness) or are very hard to wake up.   Call your doctor now if you develop symptoms such as:  Shortness of breath. Fever. Cough. If you need to get care, call ahead to the doctor's office for instructions before you go. Make sure you wear a face mask, if you have one, to prevent exposing other people to the virus. Where can you get the latest information? The following health organizations are tracking and studying this virus. Their websites contain the most up-to-date information. Efren Church also learn what to do if you think you may have been exposed to the virus. U.S. Centers for Disease Control and Prevention (CDC): The CDC provides updated news about the disease and travel advice. The website also tells you how to prevent the spread of infection. www.cdc.gov  World Health Organization [facility]): WHO offers information about the virus outbreaks. WHO also has travel advice. www.who.int  Current as of: April 1, 2020               Content Version: 12.4  © 6794-0158 [employer]. Care instructions adapted under license by your healthcare professional. If you have questions about a medical condition or this instruction, always ask your healthcare professional. [address] any warranty or liability for your use of this information.

## 2023-03-13 NOTE — PROCEDURES
[address], [address] [place], [place] [address]y  [phone]               Colonoscopy Operative Report      Indications:  Personal h/o colon polyps, last colonoscopy 2017    :  Konstantin Lafleur MD    Staff: Endoscopy Technician-1: Joleen Mauro  Endoscopy RN-1: Eran Gudino     Referring Provider: Javon Ruano MD    Sedation:  MAC anesthesia    Procedure Details:  After informed consent was obtained with all risks and benefits of procedure explained and preoperative exam completed, the patient was taken to the endoscopy suite and placed in the left lateral decubitus position. Upon sequential sedation as per above, a digital rectal exam was performed  And was normal.  The Olympus videocolonoscope  was inserted in the rectum and carefully advanced to the cecum, which was identified by the ileocecal valve and appendiceal orifice. The quality of preparation was good. The colonoscope was slowly withdrawn with careful evaluation between folds. Retroflexion in the rectum was performed and was normal..     Findings:   Rectum: normal  Sigmoid: 2  Sessile polyp(s), the largest 10 mm in size; Descending Colon: normal  Transverse Colon: normal  Ascending Colon:  5  Sessile polyp(s), the largest 10 mm in size;  Cecum: normal  Terminal Ileum: not intubated    Interventions:  7 complete polypectomy were performed using hot and cold snare  and the polyps were  retrieved    Specimen Removed:   ID Type Source Tests Collected by Time Destination   1 : polyps Preservative Colon, Ascending  Maryanne Rand MD 3/13/2023 1651 Pathology   2 : polyp Preservative Sigmoid  Maryanne Rand MD 3/13/2023 1657 Pathology       EBL:  Minimal    Complications:  None; patient tolerated the procedure well.     Impression:  -Total 7 polyps, 3-10 mm, found in the ascending and sigmoid colon, removed and sent for pathology  -Otherwise normal colon    Recommendations:   -Resume normal medication(s). -Regular diet.  -Await pathology. -If adenoma is present, repeat colonoscopy in 3 years.  -Follow up with primary care physician. Discharge Disposition:  Home in the company of a  when able to ambulate.     Nimisha Leavitt MD  3/13/2023  5:06 PM

## 2023-03-13 NOTE — PERIOP NOTES

## 2023-03-13 NOTE — PROGRESS NOTES
Remote Patient Monitoring Note      Date/Time:  3/13/2023 8:13 AM    LPN reviewed patients reported daily Remote Patient Monitoring metrics. All reported metrics are within normal limits. Current Patient Metrics ---- Activity: - mins Blood Pressure: 136/83, 72bpm Pulseox: 98%, 89bpm Survey: - Weight: 175.2lb  Plan/Follow Up: Will continue to review, monitor and address alerts with follow up based on severity of symptoms and risk factors.

## 2023-03-14 ENCOUNTER — APPOINTMENT (OUTPATIENT)
Dept: PHYSICAL THERAPY | Age: 68
End: 2023-03-14
Payer: MEDICARE

## 2023-03-14 ENCOUNTER — PATIENT OUTREACH (OUTPATIENT)
Dept: CASE MANAGEMENT | Age: 68
End: 2023-03-14

## 2023-03-14 NOTE — PROGRESS NOTES
Remote Patient Monitoring Note      Date/Time:  3/14/2023 7:37 AM    LPN reviewed patients reported daily Remote Patient Monitoring metrics. All reported metrics are within normal limits. Current Patient Metrics ---- Activity: - mins Blood Pressure: 127/85, 83bpm Pulseox: 98%, 74bpm Survey: - Weight: 175.8lbs   Plan/Follow Up: Will continue to review, monitor and address alerts with follow up based on severity of symptoms and risk factors.

## 2023-03-15 ENCOUNTER — PATIENT OUTREACH (OUTPATIENT)
Dept: CASE MANAGEMENT | Age: 68
End: 2023-03-15

## 2023-03-15 NOTE — PROGRESS NOTES
Remote Patient Monitoring Note      Date/Time:  3/15/2023 10:04 AM    LPN reviewed patients reported daily Remote Patient Monitoring metrics. All reported metrics are within normal limits. Current Patient Metrics ---- Activity: - mins Blood Pressure: 141/88, 64bpm Pulseox: 94%, 80bpm Survey: - Weight: 176.2lbs   Plan/Follow Up: Will continue to review, monitor and address alerts with follow up based on severity of symptoms and risk factors.           '0

## 2023-03-16 ENCOUNTER — HOSPITAL ENCOUNTER (OUTPATIENT)
Dept: PHYSICAL THERAPY | Age: 68
Discharge: HOME | End: 2023-03-16
Payer: MEDICARE

## 2023-03-16 ENCOUNTER — PATIENT OUTREACH (OUTPATIENT)
Dept: CASE MANAGEMENT | Age: 68
End: 2023-03-16

## 2023-03-16 PROCEDURE — 97110 THERAPEUTIC EXERCISES: CPT | Performed by: PHYSICAL THERAPIST

## 2023-03-16 PROCEDURE — 97140 MANUAL THERAPY 1/> REGIONS: CPT | Performed by: PHYSICAL THERAPIST

## 2023-03-16 NOTE — PROGRESS NOTES
Remote Patient Monitoring Note      Date/Time:  3/16/2023 7:51 AM    LPN reviewed patients reported daily Remote Patient Monitoring metrics. All reported metrics are within normal limits. Current Patient Metrics ---- Activity: - mins Blood Pressure: 144/83, 66bpm Pulseox: 98%, 84bpm Survey: - Weight: 178.0lbs  Plan/Follow Up: Will continue to review, monitor and address alerts with follow up based on severity of symptoms and risk factors.

## 2023-03-16 NOTE — ANESTHESIA POSTPROCEDURE EVALUATION
Procedure(s):  COLONOSCOPY  ENDOSCOPIC POLYPECTOMY. MAC    Anesthesia Post Evaluation        Patient location during evaluation: PACU  Note status: Adequate. Level of consciousness: responsive to verbal stimuli and sleepy but conscious  Pain management: satisfactory to patient  Airway patency: patent  Anesthetic complications: no  Cardiovascular status: acceptable  Respiratory status: acceptable  Hydration status: acceptable  Comments: +Post-Anesthesia Evaluation and Assessment    Patient: Josué Dior MRN: [id number]  SSN: [id number]   YOB: 1955  Age: 74 y.o. Sex: female          Cardiovascular Function/Vital Signs    /75   Pulse 66   Temp 36.9 °C (98.4 °F)   Resp 20   Ht 5' 6\" (1.676 m)   Wt 77.6 kg (171 lb)   SpO2 100%   Breastfeeding No   BMI 27.60 kg/m²     Patient is status post Procedure(s):  COLONOSCOPY  ENDOSCOPIC POLYPECTOMY. Nausea/Vomiting: Controlled. Postoperative hydration reviewed and adequate. Pain:  Pain Scale 1: Numeric (0 - 10) (03/13/23 1715)  Pain Intensity 1: 0 (03/13/23 1715)   Managed. Neurological Status: At baseline. Mental Status and Level of Consciousness: Arousable. Pulmonary Status:   O2 Device: None (Room air) (03/13/23 1715)   Adequate oxygenation and airway patent. Complications related to anesthesia: None    Post-anesthesia assessment completed. No concerns. I have evaluated the patient and the patient is stable and ready to be discharged from PACU .     Signed By: Creston Councilman, MD    3/16/2023        INITIAL Post-op Vital signs:   Vitals Value Taken Time   /75 03/13/23 1720   Temp 36.9 °C (98.4 °F) 03/13/23 1708   Pulse 66 03/13/23 1720   Resp 20 03/13/23 1720   SpO2 100 % 03/13/23 1720

## 2023-03-17 ENCOUNTER — PATIENT OUTREACH (OUTPATIENT)
Dept: CASE MANAGEMENT | Age: 68
End: 2023-03-17

## 2023-03-17 DIAGNOSIS — I10 ESSENTIAL HYPERTENSION: ICD-10-CM

## 2023-03-17 RX ORDER — LOSARTAN POTASSIUM 50 MG/1
50 TABLET ORAL DAILY
Qty: 30 TABLET | Refills: 1 | Status: SHIPPED | OUTPATIENT
Start: 2023-03-17

## 2023-03-17 NOTE — PROGRESS NOTES
Remote Patient Monitoring Note      Date/Time:  3/17/2023 8:42 AM    LPN reviewed patients reported daily Remote Patient Monitoring metrics. All reported metrics are within normal limits. Current Patient Metrics ---- Activity: - mins Blood Pressure: 128/84, 73bpm Pulseox: 98%, 83bpm Survey: - Weight: 178.8lbs  Plan/Follow Up: Will continue to review, monitor and address alerts with follow up based on severity of symptoms and risk factors.

## 2023-03-17 NOTE — TELEPHONE ENCOUNTER
Future Appointments:  Future Appointments   Date Time Provider Hina Sumner   3/23/2023  8:30 AM Kwesi Mujica PT [facility] OPPT [facility] REG   3/28/2023  8:30 AM [facility]   3/30/2023 10:00 AM [facility]ica [facility]   8/15/2023  9:00 AM Briana Arnett MD [facility] BS AMB        Last Appointment With Me:  2/28/2023     Requested Prescriptions     Pending Prescriptions Disp Refills    losartan (COZAAR) 50 mg tablet 30 Tablet 1     Sig: Take 1 Tablet by mouth daily.

## 2023-03-20 ENCOUNTER — PATIENT OUTREACH (OUTPATIENT)
Dept: CASE MANAGEMENT | Age: 68
End: 2023-03-20

## 2023-03-20 NOTE — PROGRESS NOTES
Remote Patient Monitoring Note      Date/Time:  3/20/2023 3:29 PM    LPN reviewed patients reported daily Remote Patient Monitoring metrics. All reported metrics are within normal limits. Current Patient Metrics ---- Activity: - mins Blood Pressure: 145/90, 69bpm Pulseox: 99%, 71bpm Survey: - Weight: 177.6lbs     Plan/Follow Up: Will continue to review, monitor and address alerts with follow up based on severity of symptoms and risk factors.

## 2023-03-21 ENCOUNTER — PATIENT OUTREACH (OUTPATIENT)
Dept: CASE MANAGEMENT | Age: 68
End: 2023-03-21

## 2023-03-21 ENCOUNTER — APPOINTMENT (OUTPATIENT)
Dept: PHYSICAL THERAPY | Age: 68
End: 2023-03-21
Payer: MEDICARE

## 2023-03-21 NOTE — PROGRESS NOTES
Remote Patient Monitoring Note      Date/Time:  3/21/2023 3:11 PM    LPN reviewed patients reported daily Remote Patient Monitoring metrics. All reported metrics are within normal limits. Current Patient Metrics ---- Activity: - mins Blood Pressure: 119/92, 73bpm Pulseox: 97%, 79bpm Survey: - Weight: 177.4lbs    Plan/Follow Up: Will continue to review, monitor and address alerts with follow up based on severity of symptoms and risk factors.

## 2023-03-22 ENCOUNTER — PATIENT OUTREACH (OUTPATIENT)
Dept: CASE MANAGEMENT | Age: 68
End: 2023-03-22

## 2023-03-22 DIAGNOSIS — I10 ESSENTIAL HYPERTENSION: ICD-10-CM

## 2023-03-22 RX ORDER — LOSARTAN POTASSIUM 50 MG/1
50 TABLET ORAL DAILY
Qty: 90 TABLET | Refills: 1 | Status: SHIPPED | OUTPATIENT
Start: 2023-03-22

## 2023-03-22 NOTE — PROGRESS NOTES
Remote Patient Monitoring Note      Date/Time:  3/22/2023 3:20 PM    LPN reviewed patients reported daily Remote Patient Monitoring metrics. All reported metrics are within normal limits. Current Patient Metrics ---- Activity: - mins Blood Pressure: 138/68, 73bpm Pulseox: 99%, 79bpm Survey: - Weight: 177.4lbs     Plan/Follow Up: Will continue to review, monitor and address alerts with follow up based on severity of symptoms and risk factors.

## 2023-03-23 ENCOUNTER — PATIENT OUTREACH (OUTPATIENT)
Dept: CASE MANAGEMENT | Age: 68
End: 2023-03-23

## 2023-03-23 ENCOUNTER — APPOINTMENT (OUTPATIENT)
Dept: PHYSICAL THERAPY | Age: 68
End: 2023-03-23
Payer: MEDICARE

## 2023-03-23 NOTE — PROGRESS NOTES
Remote Patient Monitoring Note      Date/Time:  3/23/2023 3:06 PM    LPN reviewed patients reported daily Remote Patient Monitoring metrics. All reported metrics are within normal limits. Current Patient Metrics ---- Activity: - mins Blood Pressure: 134/81, 80bpm Pulseox: 97%, 89bpm Survey: - Weight: 178.0lbs     Plan/Follow Up: Will continue to review, monitor and address alerts with follow up based on severity of symptoms and risk factors.

## 2023-03-23 NOTE — PROGRESS NOTES
Remote Patient Monitoring Care Plan      Date/Time:  3/23/2023 11:39 AM    Patient is currently enrolled in the [facility] Patient Monitoring (RPM) program for in home monitoring for HTN. Patient will be monitoring activity, blood pressure , pulse ox , weight, and survey questions daily. Interventions: Education of patient/family/caregiver/guardian to support self-management-Metrics are WNL. Have been unable to reach pt, she does work FT M-W. Sent My Chart message. Short Term Goal: Patient will engage in Remote Patient Monitoring each day to develop the skills necessary for self management. Long Term Goal: Patient will be able to complete self management skills daily for better disease management. Care Team Responsibilities:   Alerts will be reviewed daily and addressed within 2-4 hours during operational hours (Monday -Friday 9 am-4 pm)  Alert response and intervention documented in patient medical record   Patient monitored over  days  Discharge from program based on Self-Management Readiness assessment       Goals Addressed                   This Visit's Progress     Supportive resources in place to maintain patient in the community for HTN-remote patient monitoring DME equipment        03/23/23  Attempted to outreach with pt for CM follow up. LM x 2 to return this call. Mayra Núñez    03/16/23   Attempted to outreach with pt for CM/RPM follow up. LM x 2 to return this call. ESTRELLA Barnett, RN - Ambulatory Care Manager - [phone]     03/03/23  Attempted to outreach with pt for CM/RPM follow up. LM x 2 to return this call. Also sent My Chart message. ESTRELLA Barnett, RN - Ambulatory Care Manager - [phone]     02/24/23  Attempted to outreach with pt for CM/RPM follow up. LM x 2 to return this call.   ESTRELLA Barnett, RN - Ambulatory Care Manager - [phone]     02/10/23  Pt unable to talk long, on her way to work  States she is feeling fine, taking meds as directed  BP has been 120s-140s/80s  Plan to follow up with pt in 2 weeks, pt sees PCP 2/28. Brandon Wynne MSN, RN - Ambulatory Care Manager - [phone]     02/07/23   Attempted to outreach with pt for CM follow up. LM to return this call. -MLL  2:56 PM  Attempted to reach pt again. Will continue to monitor daily RPM metrics. -MLL    01/30/23  Discussed pt's BP readings, she has had some higher reading which she relates to lifestyle/busy schedule  Instructed pt to increase her Losartan to 50 mg per PCP  Discussed benefits of low sodium/no added salt diet. Discussed My Plate & will send her info  Discussed benefits of exercise (pt wants to lose 30 lbs, feel she has plateaued) & alternating different types of exercise - pt currently rides stationary bike 4 miles several times a week - suggested adding yoga/pilates/strength training  Plan to follow up with pt in 7-10 days.    ESTRELLA Kendrick, RN - Ambulatory Care Manager - [phone]     1/27/23-dkw  -spoke to pt's  (hipaa) and he asked that pt be called on her cell phone  -left message for pt on her cell number     1/26/23-dkw  -RPM care plan documented in progress note  -pt has been in the program for 39 days for HTN; VSS  -all RPM data was entered in progress note and sent to Dr Rojas No  -left message on both phone numbers asking pt to call back  -will continue to monitor  -patient needs continued CM services and her case is transitioned to Brandon Wynne RN, ACM)       12/15/22-dkw  -pt is currently enrolled in the [facility] Patient Monitoring (RPM) program for in home monitoring for HTN and will be engaged in monitoring weight, activity, blood pressure, survey questions and disease specific education modules to develop the skills necessary for self management  -will review, monitor and address alerts with follow up based on severity of symptoms and risk factors

## 2023-03-24 ENCOUNTER — PATIENT OUTREACH (OUTPATIENT)
Dept: CASE MANAGEMENT | Age: 68
End: 2023-03-24

## 2023-03-24 NOTE — PROGRESS NOTES
Remote Patient Monitoring Note      Date/Time:  3/24/2023 12:57 PM    LPN reviewed patients reported daily Remote Patient Monitoring metrics. All reported metrics are within normal limits. Current Patient Metrics ---- Activity: - mins Blood Pressure: 131/83, 75bpm Pulseox: 97%, 81bpm Survey: - Weight: 178.2lbs     Plan/Follow Up: Will continue to review, monitor and address alerts with follow up based on severity of symptoms and risk factors.

## 2023-03-27 ENCOUNTER — PATIENT OUTREACH (OUTPATIENT)
Dept: CASE MANAGEMENT | Age: 68
End: 2023-03-27

## 2023-03-27 NOTE — PROGRESS NOTES
Remote Patient Monitoring Note      Date/Time:  3/27/2023 8:09 AM    LPN reviewed patients reported daily Remote Patient Monitoring metrics. All reported metrics are within normal limits. ---- Current Patient Metrics ---- Activity: - mins Blood Pressure: 151/87, 73bpm Pulseox: 98%, 80bpm Survey: - Weight: 178.6lbs Note Created at: 03/27/2023 08:09 AM ET ---- Time-Spent: 5 minutes 0 seconds    Plan/Follow Up: Will continue to review, monitor and address alerts with follow up based on severity of symptoms and risk factors.

## 2023-03-28 ENCOUNTER — HOSPITAL ENCOUNTER (OUTPATIENT)
Dept: PHYSICAL THERAPY | Age: 68
Discharge: HOME | End: 2023-03-28
Payer: MEDICARE

## 2023-03-28 ENCOUNTER — PATIENT OUTREACH (OUTPATIENT)
Dept: CASE MANAGEMENT | Age: 68
End: 2023-03-28

## 2023-03-28 PROCEDURE — 97110 THERAPEUTIC EXERCISES: CPT | Performed by: PHYSICAL THERAPIST

## 2023-03-28 NOTE — PROGRESS NOTES
Remote Patient Monitoring Note      Date/Time:  3/28/2023 8:10 AM    LPN reviewed patients reported daily Remote Patient Monitoring metrics. All reported metrics are within normal limits. --- Current Patient Metrics ---- Activity: - mins Blood Pressure: 140/81, 73bpm Pulseox: 98%, 76bpm Survey: - Weight: 178.0lbs Note Created at: 03/28/2023 08:10 AM ET ---- Time-Spent: 5 minutes 0 seconds  Plan/Follow Up: Will continue to review, monitor and address alerts with follow up based on severity of symptoms and risk factors.

## 2023-03-29 ENCOUNTER — PATIENT OUTREACH (OUTPATIENT)
Dept: CASE MANAGEMENT | Age: 68
End: 2023-03-29

## 2023-03-29 NOTE — PROGRESS NOTES
Remote Patient Monitoring Note      Date/Time:  3/29/2023 11:12 AM    LPN reviewed patients reported daily Remote Patient Monitoring metrics. All reported metrics are within normal limits. ---- Current Patient Metrics ---- Activity: - mins Blood Pressure: 139/100, 71bpm Pulseox: 98%, 73bpm Survey: - Weight: 177.4lbs Note Created at: 03/29/2023 11:12 AM ET ---- Time-Spent: 5 minutes 0 seconds  Plan/Follow Up: Will continue to review, monitor and address alerts with follow up based on severity of symptoms and risk factors.

## 2023-03-30 ENCOUNTER — PATIENT OUTREACH (OUTPATIENT)
Dept: CASE MANAGEMENT | Age: 68
End: 2023-03-30

## 2023-03-30 ENCOUNTER — HOSPITAL ENCOUNTER (OUTPATIENT)
Dept: PHYSICAL THERAPY | Age: 68
End: 2023-03-30
Payer: MEDICARE

## 2023-03-30 PROCEDURE — 97110 THERAPEUTIC EXERCISES: CPT | Performed by: PHYSICAL THERAPIST

## 2023-03-30 NOTE — PROGRESS NOTES
Remote Patient Monitoring Note      Date/Time:  3/30/2023 9:06 AM    LPN reviewed patients reported daily Remote Patient Monitoring metrics. All reported metrics are within normal limits. ---- Current Patient Metrics ---- Activity: - mins Blood Pressure: 152/89, 71bpm Pulseox: 98%, 88bpm Survey: - Weight: 179.0lbs Note Created at: 03/30/2023 09:06 AM ET ---- Time-Spent: 5 minutes 0 seconds     Plan/Follow Up: Will continue to review, monitor and address alerts with follow up based on severity of symptoms and risk factors.

## 2023-03-31 ENCOUNTER — PATIENT OUTREACH (OUTPATIENT)
Dept: CASE MANAGEMENT | Age: 68
End: 2023-03-31

## 2023-03-31 NOTE — PROGRESS NOTES
Remote Patient Monitoring Note      Date/Time:  3/31/2023 1:20 PM    LPN reviewed patients reported daily Remote Patient Monitoring metrics. All reported metrics are within normal limits. ---- Current Patient Metrics ---- Activity: - mins Blood Pressure: 161/92, 78bpm Pulseox: -%, -bpm Survey: - Weight: 179.4lbs Note Created at: 03/31/2023 01:21 PM ET ---- Time-Spent: 5 minutes 0 seconds  Plan/Follow Up: Will continue to review, monitor and address alerts with follow up based on severity of symptoms and risk factors.

## 2023-04-04 ENCOUNTER — PATIENT OUTREACH (OUTPATIENT)
Dept: CASE MANAGEMENT | Age: 68
End: 2023-04-04

## 2023-04-04 NOTE — PROGRESS NOTES
Remote Patient Monitoring Note      Date/Time:  4/4/2023 9:23 AM    LPN reviewed patients reported daily Remote Patient Monitoring metrics. All reported metrics are within normal limits. Current Patient Metrics ---- Activity: - mins Blood Pressure: 139/94, 65bpm Pulseox: 97%, 71bpm Survey: - Weight: 176.8lbs  Plan/Follow Up: Will continue to review, monitor and address alerts with follow up based on severity of symptoms and risk factors.

## 2023-04-05 ENCOUNTER — PATIENT OUTREACH (OUTPATIENT)
Dept: CASE MANAGEMENT | Age: 68
End: 2023-04-05

## 2023-04-05 NOTE — PROGRESS NOTES
Remote Patient Monitoring Note      Date/Time:  4/5/2023 11:01 AM    LPN reviewed patients reported daily Remote Patient Monitoring metrics. All reported metrics are within normal limits. Current Patient Metrics ---- Activity: - mins Blood Pressure: 124/82, 80bpm Pulseox: 98%, 80bpm Survey: - Weight: 176.4lbs  Plan/Follow Up: Will continue to review, monitor and address alerts with follow up based on severity of symptoms and risk factors.

## 2023-04-06 ENCOUNTER — PATIENT OUTREACH (OUTPATIENT)
Dept: CASE MANAGEMENT | Age: 68
End: 2023-04-06

## 2023-04-06 NOTE — PROGRESS NOTES
Remote Patient Monitoring Note      Date/Time:  4/6/2023 8:09 AM    LPN reviewed patients reported daily Remote Patient Monitoring metrics. All reported metrics are within normal limits. Current Patient Metrics ---- Activity: - mins Blood Pressure: 130/84, 74bpm Pulseox: 98%, 78bpm Survey: - Weight: 176.2lbs  Plan/Follow Up: Will continue to review, monitor and address alerts with follow up based on severity of symptoms and risk factors.

## 2023-04-07 ENCOUNTER — PATIENT OUTREACH (OUTPATIENT)
Dept: CASE MANAGEMENT | Age: 68
End: 2023-04-07

## 2023-04-14 ENCOUNTER — HOSPITAL ENCOUNTER (OUTPATIENT)
Dept: PHYSICAL THERAPY | Age: 68
Discharge: HOME | End: 2023-04-14
Payer: MEDICARE

## 2023-04-14 PROCEDURE — 97110 THERAPEUTIC EXERCISES: CPT | Performed by: PHYSICAL THERAPIST

## 2023-04-17 ENCOUNTER — HOSPITAL ENCOUNTER (OUTPATIENT)
Dept: PHYSICAL THERAPY | Age: 68
Discharge: HOME | End: 2023-04-17
Payer: MEDICARE

## 2023-04-17 ENCOUNTER — PATIENT OUTREACH (OUTPATIENT)
Dept: CASE MANAGEMENT | Age: 68
End: 2023-04-17

## 2023-04-17 PROCEDURE — 97110 THERAPEUTIC EXERCISES: CPT

## 2023-04-17 NOTE — PROGRESS NOTES
Remote Patient Monitoring Note      Date/Time:  4/17/2023 8:42 AM    LPN reviewed patients reported daily Remote Patient Monitoring metrics. All reported metrics are within normal limits. ---- Current Patient Metrics ---- Activity: - mins Blood Pressure: 141/84, 70bpm Pulseox: 93%, 72bpm Survey: - Weight: 178.8lbs Note Created at: 04/17/2023 08:46 AM ET ---- Time-Spent: 5 minutes 0 seconds  Plan/Follow Up: Will continue to review, monitor and address alerts with follow up based on severity of symptoms and risk factors.

## 2023-04-17 NOTE — PROGRESS NOTES
PT DAILY TREATMENT NOTE - [facility] 2-15    Patient Name: Zeeshan Yañez  Date:2023  : 1955  [x]  Patient  Verified  Payor: Marlee Hullum / Plan: [place] / Product Type: Medicare /    In time:  772 AM  Out time:  6387 AM   Total Treatment Time (min): 50 minutes  Total Timed Codes (min): 50 minutes  1:1 Treatment Time ( only): 36 minutes   Visit #:  18    Treatment Area: Pain in left knee [M25.562]  Pain in right shoulder [M25.511]    SUBJECTIVE  Pain Level (0-10 scale): 1/10 --> L knee  Any medication changes, allergies to medications, adverse drug reactions, diagnosis change, or new procedure performed?: [x] No    [] Yes (see summary sheet for update)  Subjective functional status/changes:   [] No changes reported  The patient noted they did well following previous treatment session, noted the cyst behind their knee tends to give them some pain sometimes. Notes overall their knee has been doing pretty well, just continues to experience that sharp pain with their cyst at about a 5/10. OBJECTIVE    Modality rationale: decrease edema, decrease inflammation, and decrease pain to improve the patients ability to perform ADLs with less pain or discomfort.    Min Type Additional Details       [] Estim: []Att   []Unatt    []TENS instruct                  []IFC  []Premod   []NMES                     []Other:  []w/US   []w/ice   []w/heat  Position:  Location:       []  Traction: [] Cervical       []Lumbar                       [] Prone          []Supine                       []Intermittent   []Continuous Lbs:  [] before manual  [] after manual  []w/heat    []  Ultrasound: []Continuous   [] Pulsed                       at: []1MHz   []3MHz Location:  W/cm2:    [] Paraffin         Location:   []w/heat   NT [x]  Ice     []  Heat  []  Ice massage Position: Supine  Location: R shoulder    []  Laser  []  Other: Position:  Location:      []  Vasopneumatic Device Pressure:       [] lo [] med [] hi Temperature:      [x] Skin assessment post-treatment:  [x]intact []redness- no adverse reaction    []redness - adverse reaction:     50 min Therapeutic Exercise:  [x] See flow sheet :   Rationale: increase ROM, increase strength, and increase proprioception to improve the patients ability to perform ADLs with less pain or discomfort. NT min Manual Therapy: PROM with inf and post [address] mobs   MFR to UT/LS/pec/biceps musculature     Rationale: decrease pain, increase ROM, increase tissue extensibility, decrease trigger points, and increase postural awareness to improve the patients ability to perform ADLs with less pain or discomfort. With   [x] TE   [] TA   [] Neuro   [] SC   [] other: Patient Education: [x] Review HEP    [] Progressed/Changed HEP based on:   [] positioning   [] body mechanics   [] transfers   [] heat/ice application    [] other:      Other Objective/Functional Measures: None noted     Pain Level (0-10 scale) post treatment: 4/10    ASSESSMENT/Changes in Function:   Patient tolerated treatment session moderately today, able to perform new exercises and progressions. Introduced exercises emphasizing increased amounts of weight bearing today, which patient tolerated well. Patient continues to note pain posteriorly due to their cyst with certain movements, able to improve pain following short periods of rest.   Patient will continue to benefit from skilled PT services to modify and progress therapeutic interventions, address functional mobility deficits, address ROM deficits, address strength deficits, analyze and address soft tissue restrictions, analyze and cue movement patterns, analyze and modify body mechanics/ergonomics, assess and modify postural abnormalities, and instruct in home and community integration to attain remaining goals.      [x]  See Plan of Care  []  See progress note/recertification  []  See Discharge Summary         Progress towards goals / Updated goals:  Short Term Goals: To be accomplished in 8-10 treatments:              Pt will be consistent and demonstrate I with HEP-intermittently met --- 2x/ week   Pt will complain of pain 1-2/10 with all activity-progressing   Pt will demonstrate improved ROM to complete all ADL's and household chores more efficiently-MET   Pt will be able to maintain positions for 30-45 min without increased symptoms-10-15 minutes     Long Term Goals:  To be accomplished in 15-20 treatments:              Pt will complain of pain 0-1/10 with all activity-progressing  Pt will increase strength to stabilize the core and shoulder to complete all activity through full ROM without increased symptoms-progressing  Pt will increase FOTO score by 9 points to meet [address] and demonstrate improved function with all activity-Progressing  Pt will return to exercise routine and get back into her walking program without increased symptoms-MET  Pt will use proper form and posture to complete all work tasks without increased symptoms 100% of the time-Progressing      PLAN  [x]  Upgrade activities as tolerated     [x]  Continue plan of care  [x]  Update interventions per flow sheet       []  Discharge due to:_  []  Other:_      Prudencio Bills, PTA 4/17/2023

## 2023-04-18 ENCOUNTER — PATIENT OUTREACH (OUTPATIENT)
Dept: CASE MANAGEMENT | Age: 68
End: 2023-04-18

## 2023-04-18 NOTE — PROGRESS NOTES
Remote Patient Monitoring Note      Date/Time:  4/18/2023 8:58 AM    LPN reviewed patients reported daily Remote Patient Monitoring metrics. All reported metrics are within normal limits. --- Current Patient Metrics ---- Activity: - mins Blood Pressure: 156/91, 65bpm Pulseox: 99%, 88bpm Survey: - Weight: 177.6lbs Note Created at: 04/18/2023 09:00 AM ET ---- Time-Spent: 3 minutes 0 seconds  Plan/Follow Up: Will continue to review, monitor and address alerts with follow up based on severity of symptoms and risk factors.

## 2023-04-19 ENCOUNTER — HOSPITAL ENCOUNTER (OUTPATIENT)
Dept: PHYSICAL THERAPY | Age: 68
Discharge: HOME | End: 2023-04-19
Payer: MEDICARE

## 2023-04-19 ENCOUNTER — PATIENT OUTREACH (OUTPATIENT)
Dept: CASE MANAGEMENT | Age: 68
End: 2023-04-19

## 2023-04-19 PROCEDURE — 97110 THERAPEUTIC EXERCISES: CPT

## 2023-04-19 NOTE — PROGRESS NOTES
PT DAILY TREATMENT NOTE - [facility] 2-15    Patient Name: Dahiana Guy  Date:2023  : 1955  [x]  Patient  Verified  Payor: Denice Martinez / Plan: [place] MED[place] & [place] / Product Type: Medicare /    In time:  8054 AM  Out time:  2833 AM   Total Treatment Time (min): 48 minutes  Total Timed Codes (min): 48 minutes  1:1 Treatment Time ( only): 30 minutes   Visit #:  19    Treatment Area: Pain in left knee [M25.562]  Pain in right shoulder [M25.511]    SUBJECTIVE  Pain Level (0-10 scale): 2/10 --> L knee  Any medication changes, allergies to medications, adverse drug reactions, diagnosis change, or new procedure performed?: [x] No    [] Yes (see summary sheet for update)  Subjective functional status/changes:   [] No changes reported  The patient noted they felt some increased soreness following previous treatment session, noted it did take some time to clear out but they have been doing alright. Noted they feel the cyst behind their knee is a bit more irritated today so most things are really bothering them. OBJECTIVE    Modality rationale: decrease edema, decrease inflammation, and decrease pain to improve the patients ability to perform ADLs with less pain or discomfort.    Min Type Additional Details       [] Estim: []Att   []Unatt    []TENS instruct                  []IFC  []Premod   []NMES                     []Other:  []w/US   []w/ice   []w/heat  Position:  Location:       []  Traction: [] Cervical       []Lumbar                       [] Prone          []Supine                       []Intermittent   []Continuous Lbs:  [] before manual  [] after manual  []w/heat    []  Ultrasound: []Continuous   [] Pulsed                       at: []1MHz   []3MHz Location:  W/cm2:    [] Paraffin         Location:   []w/heat   NT [x]  Ice     []  Heat  []  Ice massage Position: Supine  Location: R shoulder    []  Laser  []  Other: Position:  Location:      []  Vasopneumatic Device Pressure:       [] lo [] med [] hi Temperature:      [x] Skin assessment post-treatment:  [x]intact []redness- no adverse reaction    []redness - adverse reaction:     48 min Therapeutic Exercise:  [x] See flow sheet :   Rationale: increase ROM, increase strength, and increase proprioception to improve the patients ability to perform ADLs with less pain or discomfort. NT min Manual Therapy: PROM with inf and post [address] mobs   MFR to UT/LS/pec/biceps musculature     Rationale: decrease pain, increase ROM, increase tissue extensibility, decrease trigger points, and increase postural awareness to improve the patients ability to perform ADLs with less pain or discomfort. With   [x] TE   [] TA   [] Neuro   [] SC   [] other: Patient Education: [x] Review HEP    [] Progressed/Changed HEP based on:   [] positioning   [] body mechanics   [] transfers   [] heat/ice application    [] other:      Other Objective/Functional Measures: None noted     Pain Level (0-10 scale) post treatment: 2-3/10    ASSESSMENT/Changes in Function:   Patient tolerated treatment session well today, able to perform new exercises and progressions without increasing pain symptoms post treatment session. Continued to progress with LE strengthening today, responded well to exercises, noting increased fatigue following. Introduced stretches targeting posterior musculature during today's treatment session, patient noted subjective improvements following. Continue to progress as tolerated. Patient will continue to benefit from skilled PT services to modify and progress therapeutic interventions, address functional mobility deficits, address ROM deficits, address strength deficits, analyze and address soft tissue restrictions, analyze and cue movement patterns, analyze and modify body mechanics/ergonomics, assess and modify postural abnormalities, and instruct in home and community integration to attain remaining goals.      [x]  See Plan of Care  []  See progress note/recertification  []  See Discharge Summary         Progress towards goals / Updated goals:  Short Term Goals: To be accomplished in 8-10 treatments:              Pt will be consistent and demonstrate I with HEP-intermittently met --- 2x/ week   Pt will complain of pain 1-2/10 with all activity-progressing   Pt will demonstrate improved ROM to complete all ADL's and household chores more efficiently-MET   Pt will be able to maintain positions for 30-45 min without increased symptoms-10-15 minutes     Long Term Goals:  To be accomplished in 15-20 treatments:              Pt will complain of pain 0-1/10 with all activity-progressing  Pt will increase strength to stabilize the core and shoulder to complete all activity through full ROM without increased symptoms-progressing  Pt will increase FOTO score by 9 points to meet [address] and demonstrate improved function with all activity-Progressing  Pt will return to exercise routine and get back into her walking program without increased symptoms-MET  Pt will use proper form and posture to complete all work tasks without increased symptoms 100% of the time-Progressing      PLAN  [x]  Upgrade activities as tolerated     [x]  Continue plan of care  [x]  Update interventions per flow sheet       []  Discharge due to:_  []  Other:_      Esequiel Chiu, PTA 4/19/2023

## 2023-04-19 NOTE — PROGRESS NOTES
Remote Patient Monitoring Note      Date/Time:  4/19/2023 10:52 AM    LPN reviewed patients reported daily Remote Patient Monitoring metrics. All reported metrics are within normal limits. ---- Current Patient Metrics ---- Activity: - mins Blood Pressure: 148/90, 79bpm Pulseox: 99%, 81bpm Survey: - Weight: 178.0lbs Note Created at: 04/19/2023 10:53 AM ET ---- Time-Spent: 4 minutes 0 seconds  Plan/Follow Up: Will continue to review, monitor and address alerts with follow up based on severity of symptoms and risk factors.

## 2023-04-19 NOTE — PROGRESS NOTES
PT DAILY TREATMENT NOTE - [facility] 2-15    Patient Name: Precious Higgins  Date:23  : 1955  [x]  Patient  Verified  Payor: Sandro Boon / Plan: [place] MED[place] & [place] / Product Type: Medicare /    In time:  105 AM  Out time:  918 AM   Total Treatment Time (min): 41 minutes  Total Timed Codes (min):  41 minutes  1:1 Treatment Time ( only):  35 minutes   Visit #:  17    Treatment Area: Pain in left knee [M25.562]  Pain in right shoulder [M25.511]    SUBJECTIVE  Pain Level (0-10 scale): /10  Any medication changes, allergies to medications, adverse drug reactions, diagnosis change, or new procedure performed?: [x] No    [] Yes (see summary sheet for update)  Subjective functional status/changes:   [x] No changes reported  Pt is feeling good today and having lowest pain levels in some time    OBJECTIVE    Modality rationale: decrease edema, decrease inflammation, and decrease pain to improve the patients ability to perform ADLs with less pain or discomfort.    Min Type Additional Details       [] Estim: []Att   []Unatt    []TENS instruct                  []IFC  []Premod   []NMES                     []Other:  []w/US   []w/ice   []w/heat  Position:  Location:       []  Traction: [] Cervical       []Lumbar                       [] Prone          []Supine                       []Intermittent   []Continuous Lbs:  [] before manual  [] after manual  []w/heat    []  Ultrasound: []Continuous   [] Pulsed                       at: []1MHz   []3MHz Location:  W/cm2:    [] Paraffin         Location:   []w/heat    [x]  Ice     []  Heat  []  Ice massage Position: Supine  Location: R shoulder    []  Laser  []  Other: Position:  Location:      []  Vasopneumatic Device Pressure:       [] lo [] med [] hi   Temperature:      [x] Skin assessment post-treatment:  [x]intact []redness- no adverse reaction    []redness - adverse reaction:     41 min Therapeutic Exercise:  [x] See flow sheet :   Rationale: increase ROM, increase strength, and increase proprioception to improve the patients ability to perform ADLs with less pain or discomfort. min Manual Therapy: PROM with inf and post [address] mobs   MFR to UT/LS/pec/biceps musculature     Rationale: decrease pain, increase ROM, increase tissue extensibility, decrease trigger points, and increase postural awareness to improve the patients ability to perform ADLs with less pain or discomfort. With   [x] TE   [] TA   [] Neuro   [] SC   [] other: Patient Education: [x] Review HEP    [] Progressed/Changed HEP based on:   [] positioning   [] body mechanics   [] transfers   [] heat/ice application    [] other:      Other Objective/Functional Measures: None noted     Pain Level (0-10 scale) post treatment: 1-2/10    ASSESSMENT/Changes in Function:   Pt was able to complete all activity well. She does have some discomfort by the end of the treatment. She needs to continue to strengthen in higher ranges to improve stability throughout. Will continue progressing as able. Patient will continue to benefit from skilled PT services to modify and progress therapeutic interventions, address functional mobility deficits, address ROM deficits, address strength deficits, analyze and address soft tissue restrictions, analyze and cue movement patterns, analyze and modify body mechanics/ergonomics, assess and modify postural abnormalities, and instruct in home and community integration to attain remaining goals. []  See Plan of Care  []  See progress note/recertification  []  See Discharge Summary         Progress towards goals / Updated goals:  Short Term Goals:  To be accomplished in 8-10 treatments:              Pt will be consistent and demonstrate I with HEP MET  Pt will complain of pain 1-2/10 with all activity MET Intermittently  Pt will demonstrate improved ROM to complete all ADL's and household chores more efficiently MET  Pt will be able to maintain positions for 30-45 min without increased symptoms Progressing Toward     Long Term Goals:  To be accomplished in 15-20 treatments:              Pt will complain of pain 0-1/10 with all activity Not MET  Pt will increase strength to stabilize the core and shoulder to complete all activity through full ROM without increased symptoms Progressing Toward  Pt will increase FOTO score by 9 points [facility] [address] and demonstrate improved function with all activity Not MET (only improved by 8)  Pt will return to exercise routine and get back into her walking program without increased symptoms Progressing Toward  Pt will use proper form and posture to complete all work tasks without increased symptoms 100% of the time Progressing Toward  PLAN  [x]  Upgrade activities as tolerated     [x]  Continue plan of care  [x]  Update interventions per flow sheet       []  Discharge due to:_  []  Other:_      Reji Iglesias, PT 4/14/23

## 2023-04-20 ENCOUNTER — PATIENT OUTREACH (OUTPATIENT)
Dept: CASE MANAGEMENT | Age: 68
End: 2023-04-20

## 2023-04-20 NOTE — PROGRESS NOTES
Remote Patient Monitoring Note      Date/Time:  4/20/2023 9:10 AM    LPN reviewed patients reported daily Remote Patient Monitoring metrics. All reported metrics are within normal limits. ---- Current Patient Metrics ---- Activity: - mins Blood Pressure: 130/83, 71bpm Pulseox: 97%, 88bpm Survey: - Weight: 177.2lbs Note Created at: 04/20/2023 09:11 AM ET ---- Time-Spent: 5 minutes 0 seconds   Plan/Follow Up: Will continue to review, monitor and address alerts with follow up based on severity of symptoms and risk factors.

## 2023-04-21 ENCOUNTER — PATIENT OUTREACH (OUTPATIENT)
Dept: CASE MANAGEMENT | Age: 68
End: 2023-04-21

## 2023-04-21 NOTE — PROGRESS NOTES
Remote Patient Monitoring Note      Date/Time:  4/21/2023 11:05 AM    LPN reviewed patients reported daily Remote Patient Monitoring metrics. All reported metrics are within normal limits. Josh Merino ---- Current Patient Metrics ---- Activity: - mins Blood Pressure: 155/86, 76bpm Pulseox: -%, -bpm Survey: - Weight: 177.8lbs Note Created at: 04/21/2023 11:05 AM ET ---- Time-Spent: 5 minutes 0 seconds    Plan/Follow Up: Will continue to review, monitor and address alerts with follow up based on severity of symptoms and risk factors.

## 2023-04-24 ENCOUNTER — HOSPITAL ENCOUNTER (OUTPATIENT)
Dept: PHYSICAL THERAPY | Age: 68
Discharge: HOME | End: 2023-04-24
Payer: MEDICARE

## 2023-04-24 ENCOUNTER — PATIENT OUTREACH (OUTPATIENT)
Dept: CASE MANAGEMENT | Age: 68
End: 2023-04-24

## 2023-04-24 PROCEDURE — 97110 THERAPEUTIC EXERCISES: CPT

## 2023-04-24 NOTE — PROGRESS NOTES
PT DAILY TREATMENT NOTE - [facility] 2-15    Patient Name: Ned Patton  Date:2023  : 1955  [x]  Patient  Verified  Payor: Ethel Citlaly / Plan: [place] MEDICARE PART A & B / Product Type: Medicare /    In time:  081 AM  Out time:  1021 AM   Total Treatment Time (min): 48 minutes  Total Timed Codes (min): 48 minutes  1:1 Treatment Time ( only): 45 minutes   Visit #:  20    Treatment Area: Pain in left knee [M25.562]  Pain in right shoulder [M25.511]    SUBJECTIVE  Pain Level (0-10 scale): 2/10 --> L knee/R shoulder   Any medication changes, allergies to medications, adverse drug reactions, diagnosis change, or new procedure performed?: [x] No    [] Yes (see summary sheet for update)  Subjective functional status/changes:   [] No changes reported  The patient noted things have been \"about the same,\" since previous treatment session, noted they have been doing alright and noted some increased soreness following previous treatment session. OBJECTIVE    Modality rationale: decrease edema, decrease inflammation, and decrease pain to improve the patients ability to perform ADLs with less pain or discomfort.    Min Type Additional Details       [] Estim: []Att   []Unatt    []TENS instruct                  []IFC  []Premod   []NMES                     []Other:  []w/US   []w/ice   []w/heat  Position:  Location:       []  Traction: [] Cervical       []Lumbar                       [] Prone          []Supine                       []Intermittent   []Continuous Lbs:  [] before manual  [] after manual  []w/heat    []  Ultrasound: []Continuous   [] Pulsed                       at: []1MHz   []3MHz Location:  W/cm2:    [] Paraffin         Location:   []w/heat   NT [x]  Ice     []  Heat  []  Ice massage Position: Supine  Location: R shoulder    []  Laser  []  Other: Position:  Location:      []  Vasopneumatic Device Pressure:       [] lo [] med [] hi   Temperature:      [x] Skin assessment post-treatment:  [x]intact []redness- no adverse reaction    []redness - adverse reaction:     48 min Therapeutic Exercise:  [x] See flow sheet :   Rationale: increase ROM, increase strength, and increase proprioception to improve the patients ability to perform ADLs with less pain or discomfort. NT min Manual Therapy: PROM with inf and post [address] mobs   MFR to UT/LS/pec/biceps musculature     Rationale: decrease pain, increase ROM, increase tissue extensibility, decrease trigger points, and increase postural awareness to improve the patients ability to perform ADLs with less pain or discomfort. With   [x] TE   [] TA   [] Neuro   [] SC   [] other: Patient Education: [x] Review HEP    [] Progressed/Changed HEP based on:   [] positioning   [] body mechanics   [] transfers   [] heat/ice application    [] other:      Other Objective/Functional Measures: None noted     Pain Level (0-10 scale) post treatment: 3/10: R shoulder, L knee    ASSESSMENT/Changes in Function:   Patient tolerated treatment session moderately, able to perform exercises and progressions. Time was spent reviewing HEP and discussing plans for treatment moving forward following reassessment next treatment session. Patient needed reduced verbal cuing during treatment session today compared to previous treatment session. Continues to note increased TTP of gastroc/soleus complex during manual therapy. Continue to progress as tolerated. Patient will continue to benefit from skilled PT services to modify and progress therapeutic interventions, address functional mobility deficits, address ROM deficits, address strength deficits, analyze and address soft tissue restrictions, analyze and cue movement patterns, analyze and modify body mechanics/ergonomics, assess and modify postural abnormalities, and instruct in home and community integration to attain remaining goals.      [x]  See Plan of Care  []  See progress note/recertification  []  See Discharge Summary         Progress towards goals / Updated goals:  Short Term Goals: To be accomplished in 8-10 treatments:              Pt will be consistent and demonstrate I with HEP-intermittently met --- 2x/ week   Pt will complain of pain 1-2/10 with all activity-progressing   Pt will demonstrate improved ROM to complete all ADL's and household chores more efficiently-MET   Pt will be able to maintain positions for 30-45 min without increased symptoms-10-15 minutes     Long Term Goals:  To be accomplished in 15-20 treatments:              Pt will complain of pain 0-1/10 with all activity-progressing  Pt will increase strength to stabilize the core and shoulder to complete all activity through full ROM without increased symptoms-progressing  Pt will increase FOTO score by 9 points to meet [address] and demonstrate improved function with all activity-Progressing  Pt will return to exercise routine and get back into her walking program without increased symptoms-MET  Pt will use proper form and posture to complete all work tasks without increased symptoms 100% of the time-Progressing      PLAN  [x]  Upgrade activities as tolerated     [x]  Continue plan of care  [x]  Update interventions per flow sheet       []  Discharge due to:_  []  Other:_      Rajat Bellamy, PTA 4/24/2023

## 2023-04-24 NOTE — PROGRESS NOTES
Remote Patient Monitoring Note      Date/Time:  4/24/2023 9:45 AM    LPN reviewed patients reported daily Remote Patient Monitoring metrics. All reported metrics are within normal limits. Plan/Follow Up: Will continue to review, monitor and address alerts with follow up based on severity of symptoms and risk factors.

## 2023-04-25 ENCOUNTER — PATIENT OUTREACH (OUTPATIENT)
Dept: CASE MANAGEMENT | Age: 68
End: 2023-04-25

## 2023-04-26 ENCOUNTER — PATIENT OUTREACH (OUTPATIENT)
Dept: CASE MANAGEMENT | Age: 68
End: 2023-04-26

## 2023-04-26 ENCOUNTER — HOSPITAL ENCOUNTER (OUTPATIENT)
Dept: PHYSICAL THERAPY | Age: 68
Discharge: HOME | End: 2023-04-26
Payer: MEDICARE

## 2023-04-26 PROCEDURE — 97110 THERAPEUTIC EXERCISES: CPT

## 2023-04-26 NOTE — PROGRESS NOTES
Remote Patient Monitoring Note      Date/Time:  4/26/2023 11:44 AM    LPN reviewed patients reported daily Remote Patient Monitoring metrics. All reported metrics are within normal limits. Current Patient Metrics ---- Activity: - mins Blood Pressure: 165/96, 70bpm Pulseox: -%, -bpm Survey: - Weight: 177.4lbs  Plan/Follow Up: Will continue to review, monitor and address alerts with follow up based on severity of symptoms and risk factors.

## 2023-04-26 NOTE — PROGRESS NOTES
Remote Patient Monitoring Note      Date/Time:  4/26/2023 3:54 PM    LPN reviewed patients reported daily Remote Patient Monitoring metrics. All reported metrics are within normal limits. Plan/Follow Up: Will continue to review, monitor and address alerts with follow up based on severity of symptoms and risk factors.

## 2023-04-26 NOTE — PROGRESS NOTES
[address] Physical [facility]  [address] ([address]), [address]  Phone: [phone] Fax: [phone]    Discharge Summary 2-15    Patient name: Leyda Wesley  : 1955  Provider#: [id number]  Referral source: Andres Lutz DO      Medical/Treatment Diagnosis: Pain in left knee [M25.562]  Pain in right shoulder [M25.511]     Prior Hospitalization: see medical history     Comorbidities: See Plan of Care  Prior Level of Function: See Plan of Care  Medications: Verified on Patient Summary List    Start of Care: 1/3/2023      Onset Date:chronic    Visits from Start of Care: 24     Missed Visits: 1  Reporting Period : 1/3/2023 to 2023    Assessment/Summary of care: Patient is a 74year old being seen for right shoulder and left hip/knee pain. Patient has been seen for 24 visits and has been treated using manual therapy and therapeutic exercise to make improvements with subjective improvements, functional outcome measures, lower quarter muscle strength and improved upper quarter muscle strength. Patient is a 74year old being seen for right shoulder and left hip/knee pain. Patient has been seen for 24 visits and has made improvements with subjective improvements, functional outcome measures, lower quarter muscle strength and improved upper quarter muscle strength. Patient demonstrated improved during today's reassessment with upper quarter muscle strength with flexion, abduction, and IR/ER. Patient has reported subjective improvements with UE function, noting they have been able to use their shoulder for more activities such as reaching overhead and grabbing things from the cabinet. Patient also noted improved subjective LE functioning, noting they have noticed improved balance and less irritation with their walking activities.  Patient has achieved 4/9 goals set for treatment with progress being made towards final goals for rehab. Patient will be discharged following today's treatment session to a HEP to manage symptoms on their own. Short Term Goals: To be accomplished in 8-10 treatments:              Pt will be consistent and demonstrate I with HEP- MET ---> 4-5 x throughout week   Pt will complain of pain 1-2/10 with all activity-progressing   Pt will demonstrate improved ROM to complete all ADL's and household chores more efficiently-MET   Pt will be able to maintain positions for 30-45 min without increased symptoms - MET      Long Term Goals:  To be accomplished in 15-20 treatments:              Pt will complain of pain 0-1/10 with all activity-progressing  Pt will increase strength to stabilize the core and shoulder to complete all activity through full ROM without increased symptoms-progressing  Pt will increase FOTO score by 9 points to meet [address] and demonstrate improved function with all activity-Progressing  Pt will return to exercise routine and get back into her walking program without increased symptoms-MET  Pt will use proper form and posture to complete all work tasks without increased symptoms 100% of the time- Progressing        RECOMMENDATIONS:  [x]Discontinue therapy: [x]Patient has reached or is progressing toward set goals     []Patient is non-compliant or has abdicated     []Due to lack of appreciable progress towards set goals     []Other  Paula Moe, PTA 4/26/2023

## 2023-04-26 NOTE — PROGRESS NOTES
PT DAILY TREATMENT NOTE - [facility] 2-15    Patient Name: Lata Kiser  Date:2023  : 1955  [x]  Patient  Verified  Payor: Myriam Mendoza / Plan: [place] MEDICARE PART A & B / Product Type: Medicare /    In time:  6432 AM  Out time:  8790 AM   Total Treatment Time (min): 36 minutes  Total Timed Codes (min): 36 minutes  1:1 Treatment Time ( only): 27 minutes   Visit #:  21    Treatment Area: Pain in left knee [M25.562]  Pain in right shoulder [M25.511]    SUBJECTIVE  Pain Level (0-10 scale): 2/10 --> L knee/R shoulder   Any medication changes, allergies to medications, adverse drug reactions, diagnosis change, or new procedure performed?: [x] No    [] Yes (see summary sheet for update)  Subjective functional status/changes:   [] No changes reported  The patient noted they have been doing pretty good since previous treatment session, noted they have been able to continue working on exercises. Noted they feel their knees and shoulder have gotten a lot stronger and have noted improvements all around. OBJECTIVE    Modality rationale: decrease edema, decrease inflammation, and decrease pain to improve the patients ability to perform ADLs with less pain or discomfort.    Min Type Additional Details       [] Estim: []Att   []Unatt    []TENS instruct                  []IFC  []Premod   []NMES                     []Other:  []w/US   []w/ice   []w/heat  Position:  Location:       []  Traction: [] Cervical       []Lumbar                       [] Prone          []Supine                       []Intermittent   []Continuous Lbs:  [] before manual  [] after manual  []w/heat    []  Ultrasound: []Continuous   [] Pulsed                       at: []1MHz   []3MHz Location:  W/cm2:    [] Paraffin         Location:   []w/heat   NT [x]  Ice     []  Heat  []  Ice massage Position: Supine  Location: R shoulder    []  Laser  []  Other: Position:  Location:      []  Vasopneumatic Device Pressure:       [] lo [] med [] hi   Temperature: [x] Skin assessment post-treatment:  [x]intact []redness- no adverse reaction    []redness - adverse reaction:     36 min Therapeutic Exercise:  [x] See flow sheet :   Rationale: increase ROM, increase strength, and increase proprioception to improve the patients ability to perform ADLs with less pain or discomfort. NT min Manual Therapy: PROM with inf and post [address] mobs   MFR to UT/LS/pec/biceps musculature     Rationale: decrease pain, increase ROM, increase tissue extensibility, decrease trigger points, and increase postural awareness to improve the patients ability to perform ADLs with less pain or discomfort. With   [x] TE   [] TA   [] Neuro   [] SC   [] other: Patient Education: [x] Review HEP    [] Progressed/Changed HEP based on:   [] positioning   [] body mechanics   [] transfers   [] heat/ice application    [] other:      Other Objective/Functional Measures:    FOTO: Knee: 60/100 Shoulder: 57/100  LOWER QUARTER                            MUSCLE STRENGTH  KEY                                                                             R                      L  0 - No Contraction                   Knee ext                      4+                    4+  1 - Trace                                           flex                     5                      4  2 - Poor                                   Hip ext                         NT                   NT  3 - Fair                                           flex                         4+                   4  4 - Good                                        abd                        4+                    4+  5 - Normal                                     add                        NT                   NT                                                          ER                       4+                    4                                                  Ankle DF                     5               5 UPPER QUARTER                             MUSCLE STRENGTH  KEY                                                                             R                      L  0 - No Contraction                               Flexion             3+                     3+  1 - Trace                                              Extension        NT                   NT  2 - Poor                                               Abduction        4                      3+  3 - Fair                                                 IR                     4+                     4+  4 - Good                                              ER                   4                      3+  5 - Normal                                       Pain Level (0-10 scale) post treatment: R shoulder: 3/10 L knee: 2/10    ASSESSMENT/Changes in Function:   Patient is a 74year old being seen for right shoulder and left hip/knee pain. Patient has been seen for 24 visits and has made improvements with subjective improvements, functional outcome measures, lower quarter muscle strength and improved upper quarter muscle strength. Patient demonstrated improved during today's reassessment with upper quarter muscle strength with flexion, abduction, and IR/ER. Patient has reported subjective improvements with UE function, noting they have been able to use their shoulder for more activities such as reaching overhead and grabbing things from the cabinet. Patient also noted improved subjective LE functioning, noting they have noticed improved balance and less irritation with their walking activities. Patient has achieved 4/9 goals set for treatment with progress being made towards final goals for rehab. Patient will be discharged following today's treatment session to a [facility] to manage symptoms on their own.        []  See Plan of Care  []  See progress note/recertification  [x]  See Discharge Summary         Progress towards goals / Updated goals:  Short Term Goals:  To be accomplished in 8-10 treatments:              Pt will be consistent and demonstrate I with HEP- MET ---> 4-5 x throughout week   Pt will complain of pain 1-2/10 with all activity-progressing   Pt will demonstrate improved ROM to complete all ADL's and household chores more efficiently-MET   Pt will be able to maintain positions for 30-45 min without increased symptoms - MET      Long Term Goals:  To be accomplished in 15-20 treatments:              Pt will complain of pain 0-1/10 with all activity-progressing  Pt will increase strength to stabilize the core and shoulder to complete all activity through full ROM without increased symptoms-progressing  Pt will increase FOTO score by 9 points to meet [address] and demonstrate improved function with all activity-Progressing  Pt will return to exercise routine and get back into her walking program without increased symptoms-MET  Pt will use proper form and posture to complete all work tasks without increased symptoms 100% of the time- Progressing     PLAN  [x]  Upgrade activities as tolerated     [x]  Continue plan of care  [x]  Update interventions per flow sheet       []  Discharge due to:_  []  Other:_      Court Sandra, PTA 4/26/2023

## 2023-04-27 ENCOUNTER — PATIENT OUTREACH (OUTPATIENT)
Dept: CASE MANAGEMENT | Age: 68
End: 2023-04-27

## 2023-04-27 NOTE — PROGRESS NOTES
Remote Patient Monitoring Note      Date/Time:  4/27/2023 1:12 PM    LPN reviewed patients reported daily Remote Patient Monitoring metrics. All reported metrics are within normal limits. Current Patient Metrics ---- Activity: - mins Blood Pressure: 136/83, 81bpm Pulseox: -%, -bpm Survey: - Weight: 178.0lbs   Plan/Follow Up: Will continue to review, monitor and address alerts with follow up based on severity of symptoms and risk factors.

## 2023-04-28 ENCOUNTER — PATIENT OUTREACH (OUTPATIENT)
Dept: CASE MANAGEMENT | Age: 68
End: 2023-04-28

## 2023-04-28 NOTE — PROGRESS NOTES
Remote Patient Monitoring Note      Date/Time:  4/28/2023 8:34 AM    LPN reviewed patients reported daily Remote Patient Monitoring metrics. All reported metrics are within normal limits. Current Patient Metrics ---- Activity: - mins Blood Pressure: 150/76, 82bpm Pulseox: -%, -bpm Survey: - Weight: -lbs  Plan/Follow Up: Will continue to review, monitor and address alerts with follow up based on severity of symptoms and risk factors.

## 2023-05-01 ENCOUNTER — PATIENT OUTREACH (OUTPATIENT)
Dept: CASE MANAGEMENT | Age: 68
End: 2023-05-01

## 2023-05-01 NOTE — PROGRESS NOTES
Remote Patient Monitoring Note      Date/Time:  5/1/2023 1:57 PM    LPN reviewed patients reported daily Remote Patient Monitoring metrics. All reported metrics are within normal limits. Current Patient Metrics ---- Activity: - mins Blood Pressure: 128/86, 70bpm Pulseox: -%, -bpm Survey: - Weight: 180.4lbs    Plan/Follow Up: Will continue to review, monitor and address alerts with follow up based on severity of symptoms and risk factors.

## 2023-05-02 ENCOUNTER — PATIENT OUTREACH (OUTPATIENT)
Dept: CASE MANAGEMENT | Age: 68
End: 2023-05-02

## 2023-05-03 ENCOUNTER — PATIENT OUTREACH (OUTPATIENT)
Dept: CASE MANAGEMENT | Age: 68
End: 2023-05-03

## 2023-05-04 ENCOUNTER — PATIENT OUTREACH (OUTPATIENT)
Dept: CASE MANAGEMENT | Age: 68
End: 2023-05-04

## 2023-05-05 ENCOUNTER — PATIENT OUTREACH (OUTPATIENT)
Dept: CASE MANAGEMENT | Age: 68
End: 2023-05-05

## 2023-06-27 ENCOUNTER — TELEPHONE (OUTPATIENT)
Age: 68
End: 2023-06-27

## 2023-10-23 ENCOUNTER — TELEPHONE (OUTPATIENT)
Age: 68
End: 2023-10-23

## 2023-10-23 NOTE — TELEPHONE ENCOUNTER
----- Message from Juan Antonio Sanchez sent at 10/23/2023  2:03 PM EDT -----  Subject: Message to Provider    QUESTIONS  Information for Provider? Patient also wants to know why her losartin in   only filled for one month and not 90 days please call back to assist  ---------------------------------------------------------------------------  --------------  [address]  [phone]; OK to leave message on voicemail  ---------------------------------------------------------------------------  --------------  SCRIPT ANSWERS  Relationship to Patient?  Self

## 2023-10-23 NOTE — TELEPHONE ENCOUNTER
----- Message from Valerie Diana sent at 10/23/2023  9:32 AM EDT -----  Subject: Appointment Request    Reason for Call: Established Patient Appointment needed: Routine Medicare   AWV    QUESTIONS    Reason for appointment request? No appointments available during search     Additional Information for Provider? patient would like AWV scheduled but   it looks like it was already scheduled for 3/24/24.  She would like it   earlier of possible  ---------------------------------------------------------------------------  --------------  Frankie AGUILAR  [phone]; OK to leave message on voicemail  ---------------------------------------------------------------------------  --------------  SCRIPT ANSWERS

## 2023-10-24 ENCOUNTER — CLINICAL DOCUMENTATION (OUTPATIENT)
Age: 68
End: 2023-10-24

## 2023-10-24 RX ORDER — LOSARTAN POTASSIUM 50 MG/1
50 TABLET ORAL DAILY
Qty: 90 TABLET | Refills: 3 | Status: SHIPPED | OUTPATIENT
Start: 2023-10-24 | End: 2023-10-25 | Stop reason: SDUPTHER

## 2023-10-25 RX ORDER — LOSARTAN POTASSIUM 50 MG/1
50 TABLET ORAL DAILY
Qty: 90 TABLET | Refills: 3 | Status: SHIPPED | OUTPATIENT
Start: 2023-10-25

## 2023-10-30 ENCOUNTER — OFFICE VISIT (OUTPATIENT)
Age: 68
End: 2023-10-30
Payer: MEDICARE

## 2023-10-30 VITALS
WEIGHT: 178.78 LBS | HEART RATE: 80 BPM | RESPIRATION RATE: 16 BRPM | BODY MASS INDEX: 28.73 KG/M2 | OXYGEN SATURATION: 95 % | SYSTOLIC BLOOD PRESSURE: 132 MMHG | TEMPERATURE: 97.7 F | HEIGHT: 66 IN | DIASTOLIC BLOOD PRESSURE: 68 MMHG

## 2023-10-30 DIAGNOSIS — Z00.00 MEDICARE ANNUAL WELLNESS VISIT, SUBSEQUENT: Primary | ICD-10-CM

## 2023-10-30 DIAGNOSIS — E78.1 PURE HYPERTRIGLYCERIDEMIA: ICD-10-CM

## 2023-10-30 DIAGNOSIS — R73.09 ABNORMAL GLUCOSE: ICD-10-CM

## 2023-10-30 DIAGNOSIS — D64.9 ANEMIA, UNSPECIFIED TYPE: ICD-10-CM

## 2023-10-30 DIAGNOSIS — R01.1 HEART MURMUR, SYSTOLIC: ICD-10-CM

## 2023-10-30 DIAGNOSIS — I10 PRIMARY HYPERTENSION: ICD-10-CM

## 2023-10-30 PROCEDURE — 3078F DIAST BP <80 MM HG: CPT | Performed by: STUDENT IN AN ORGANIZED HEALTH CARE EDUCATION/TRAINING PROGRAM

## 2023-10-30 PROCEDURE — G8484 FLU IMMUNIZE NO ADMIN: HCPCS | Performed by: STUDENT IN AN ORGANIZED HEALTH CARE EDUCATION/TRAINING PROGRAM

## 2023-10-30 PROCEDURE — 3075F SYST BP GE 130 - 139MM HG: CPT | Performed by: STUDENT IN AN ORGANIZED HEALTH CARE EDUCATION/TRAINING PROGRAM

## 2023-10-30 PROCEDURE — 1123F ACP DISCUSS/DSCN MKR DOCD: CPT | Performed by: STUDENT IN AN ORGANIZED HEALTH CARE EDUCATION/TRAINING PROGRAM

## 2023-10-30 PROCEDURE — 3017F COLORECTAL CA SCREEN DOC REV: CPT | Performed by: STUDENT IN AN ORGANIZED HEALTH CARE EDUCATION/TRAINING PROGRAM

## 2023-10-30 PROCEDURE — G0439 PPPS, SUBSEQ VISIT: HCPCS | Performed by: STUDENT IN AN ORGANIZED HEALTH CARE EDUCATION/TRAINING PROGRAM

## 2023-10-30 RX ORDER — FERROUS SULFATE 137(45) MG
142 TABLET, EXTENDED RELEASE ORAL DAILY
Qty: 90 TABLET | Refills: 1 | Status: SHIPPED | OUTPATIENT
Start: 2023-10-30

## 2023-10-30 RX ORDER — LOSARTAN POTASSIUM 50 MG/1
50 TABLET ORAL DAILY
Qty: 90 TABLET | Refills: 3 | Status: SHIPPED | OUTPATIENT
Start: 2023-10-30

## 2023-10-30 SDOH — ECONOMIC STABILITY: HOUSING INSECURITY
IN THE LAST 12 MONTHS, WAS THERE A TIME WHEN YOU DID NOT HAVE A STEADY PLACE TO SLEEP OR SLEPT IN A SHELTER (INCLUDING NOW)?: NO

## 2023-10-30 SDOH — ECONOMIC STABILITY: INCOME INSECURITY: HOW HARD IS IT FOR YOU TO PAY FOR THE VERY BASICS LIKE FOOD, HOUSING, MEDICAL CARE, AND HEATING?: NOT HARD AT ALL

## 2023-10-30 SDOH — ECONOMIC STABILITY: FOOD INSECURITY: WITHIN THE PAST 12 MONTHS, THE FOOD YOU BOUGHT JUST DIDN'T LAST AND YOU DIDN'T HAVE MONEY TO GET MORE.: NEVER TRUE

## 2023-10-30 SDOH — ECONOMIC STABILITY: FOOD INSECURITY: WITHIN THE PAST 12 MONTHS, YOU WORRIED THAT YOUR FOOD WOULD RUN OUT BEFORE YOU GOT MONEY TO BUY MORE.: NEVER TRUE

## 2023-10-30 ASSESSMENT — PATIENT HEALTH QUESTIONNAIRE - PHQ9
SUM OF ALL RESPONSES TO PHQ QUESTIONS 1-9: 0
SUM OF ALL RESPONSES TO PHQ QUESTIONS 1-9: 0
1. LITTLE INTEREST OR PLEASURE IN DOING THINGS: 0
SUM OF ALL RESPONSES TO PHQ QUESTIONS 1-9: 0
SUM OF ALL RESPONSES TO PHQ9 QUESTIONS 1 & 2: 0
2. FEELING DOWN, DEPRESSED OR HOPELESS: 0
SUM OF ALL RESPONSES TO PHQ QUESTIONS 1-9: 0

## 2023-10-30 NOTE — PROGRESS NOTES
Chief Complaint   Patient presents with    Medicare AWV     Welcome to Medicare         1. Have you been to the ER, urgent care clinic since your last visit? Hospitalized since your last visit? No    2. Have you seen or consulted any other health care providers outside of the [address] [place] [address] Avenue since your last visit? Include any pap smears or colon screening.  No

## 2023-10-31 LAB
ALBUMIN SERPL-MCNC: 4 G/DL (ref 3.5–5)
ALBUMIN/GLOB SERPL: 1 (ref 1.1–2.2)
ALP SERPL-CCNC: 91 U/L (ref 45–117)
ALT SERPL-CCNC: 22 U/L (ref 12–78)
ANION GAP SERPL CALC-SCNC: 3 MMOL/L (ref 5–15)
AST SERPL-CCNC: 15 U/L (ref 15–37)
BASOPHILS # BLD: 0 K/UL (ref 0–0.1)
BASOPHILS NFR BLD: 1 % (ref 0–1)
BILIRUB SERPL-MCNC: 0.4 MG/DL (ref 0.2–1)
BUN SERPL-MCNC: 18 MG/DL (ref 6–20)
BUN/CREAT SERPL: 23 (ref 12–20)
CALCIUM SERPL-MCNC: 8.6 MG/DL (ref 8.5–10.1)
CHLORIDE SERPL-SCNC: 108 MMOL/L (ref 97–108)
CHOLEST SERPL-MCNC: 193 MG/DL
CO2 SERPL-SCNC: 27 MMOL/L (ref 21–32)
CREAT SERPL-MCNC: 0.8 MG/DL (ref 0.55–1.02)
DIFFERENTIAL METHOD BLD: ABNORMAL
EOSINOPHIL # BLD: 0.4 K/UL (ref 0–0.4)
EOSINOPHIL NFR BLD: 5 % (ref 0–7)
ERYTHROCYTE [DISTWIDTH] IN BLOOD BY AUTOMATED COUNT: 14.5 % (ref 11.5–14.5)
EST. AVERAGE GLUCOSE BLD GHB EST-MCNC: 108 MG/DL
GLOBULIN SER CALC-MCNC: 4.2 G/DL (ref 2–4)
GLUCOSE SERPL-MCNC: 122 MG/DL (ref 65–100)
HBA1C MFR BLD: 5.4 % (ref 4–5.6)
HCT VFR BLD AUTO: 32.9 % (ref 35–47)
HDLC SERPL-MCNC: 53 MG/DL
HDLC SERPL: 3.6 (ref 0–5)
HGB BLD-MCNC: 10.3 G/DL (ref 11.5–16)
IMM GRANULOCYTES # BLD AUTO: 0 K/UL (ref 0–0.04)
IMM GRANULOCYTES NFR BLD AUTO: 0 % (ref 0–0.5)
LDLC SERPL CALC-MCNC: 99.6 MG/DL (ref 0–100)
LYMPHOCYTES # BLD: 3.3 K/UL (ref 0.8–3.5)
LYMPHOCYTES NFR BLD: 45 % (ref 12–49)
MCH RBC QN AUTO: 32.5 PG (ref 26–34)
MCHC RBC AUTO-ENTMCNC: 31.3 G/DL (ref 30–36.5)
MCV RBC AUTO: 103.8 FL (ref 80–99)
MONOCYTES # BLD: 0.5 K/UL (ref 0–1)
MONOCYTES NFR BLD: 7 % (ref 5–13)
NEUTS SEG # BLD: 3 K/UL (ref 1.8–8)
NEUTS SEG NFR BLD: 42 % (ref 32–75)
NRBC # BLD: 0 K/UL (ref 0–0.01)
NRBC BLD-RTO: 0 PER 100 WBC
PLATELET # BLD AUTO: 295 K/UL (ref 150–400)
PMV BLD AUTO: 9.5 FL (ref 8.9–12.9)
POTASSIUM SERPL-SCNC: 3.6 MMOL/L (ref 3.5–5.1)
PROT SERPL-MCNC: 8.2 G/DL (ref 6.4–8.2)
RBC # BLD AUTO: 3.17 M/UL (ref 3.8–5.2)
SODIUM SERPL-SCNC: 138 MMOL/L (ref 136–145)
TRIGL SERPL-MCNC: 202 MG/DL
VLDLC SERPL CALC-MCNC: 40.4 MG/DL
WBC # BLD AUTO: 7.1 K/UL (ref 3.6–11)

## 2023-11-09 ENCOUNTER — TELEPHONE (OUTPATIENT)
Age: 68
End: 2023-11-09

## 2023-11-10 ENCOUNTER — HOSPITAL ENCOUNTER (OUTPATIENT)
Facility: HOSPITAL | Age: 68
End: 2023-11-10
Attending: STUDENT IN AN ORGANIZED HEALTH CARE EDUCATION/TRAINING PROGRAM
Payer: MEDICARE

## 2023-11-10 ENCOUNTER — TELEPHONE (OUTPATIENT)
Age: 68
End: 2023-11-10

## 2023-11-10 VITALS
WEIGHT: 178.79 LBS | SYSTOLIC BLOOD PRESSURE: 143 MMHG | BODY MASS INDEX: 28.73 KG/M2 | DIASTOLIC BLOOD PRESSURE: 73 MMHG | HEIGHT: 66 IN

## 2023-11-10 DIAGNOSIS — R01.1 HEART MURMUR, SYSTOLIC: ICD-10-CM

## 2023-11-10 LAB
ECHO AO ROOT DIAM: 3 CM
ECHO AO ROOT INDEX: 1.57 CM/M2
ECHO AV AREA PEAK VELOCITY: 2.5 CM2
ECHO AV AREA/BSA PEAK VELOCITY: 1.3 CM2/M2
ECHO AV PEAK GRADIENT: 8 MMHG
ECHO AV PEAK VELOCITY: 1.4 M/S
ECHO AV VELOCITY RATIO: 0.86
ECHO BSA: 1.94 M2
ECHO EST RA PRESSURE: 3 MMHG
ECHO LA DIAMETER INDEX: 1.88 CM/M2
ECHO LA DIAMETER: 3.6 CM
ECHO LA TO AORTIC ROOT RATIO: 1.2
ECHO LA VOL A-L A2C: 80 ML (ref 22–52)
ECHO LA VOL A-L A4C: 42 ML (ref 22–52)
ECHO LA VOL BP: 63 ML (ref 22–52)
ECHO LA VOL MOD A2C: 75 ML (ref 22–52)
ECHO LA VOL MOD A4C: 40 ML (ref 22–52)
ECHO LA VOL/BSA BIPLANE: 33 ML/M2 (ref 16–34)
ECHO LA VOLUME AREA LENGTH: 66 ML
ECHO LA VOLUME INDEX A-L A2C: 42 ML/M2 (ref 16–34)
ECHO LA VOLUME INDEX A-L A4C: 22 ML/M2 (ref 16–34)
ECHO LA VOLUME INDEX AREA LENGTH: 35 ML/M2 (ref 16–34)
ECHO LA VOLUME INDEX MOD A2C: 39 ML/M2 (ref 16–34)
ECHO LA VOLUME INDEX MOD A4C: 21 ML/M2 (ref 16–34)
ECHO LV E' SEPTAL VELOCITY: 15 CM/S
ECHO LV EJECTION FRACTION A2C: 51 %
ECHO LV EJECTION FRACTION A4C: 60 %
ECHO LV FRACTIONAL SHORTENING: 36 % (ref 28–44)
ECHO LV GLOBAL LONGITUDINAL STRAIN (GLS): -13.7 %
ECHO LV GLOBAL LONGITUDINAL STRAIN (GLS): -13.9 %
ECHO LV GLOBAL LONGITUDINAL STRAIN (GLS): -14.3 %
ECHO LV GLOBAL LONGITUDINAL STRAIN (GLS): -15.5 %
ECHO LV INTERNAL DIMENSION DIASTOLE INDEX: 2.46 CM/M2
ECHO LV INTERNAL DIMENSION DIASTOLIC: 4.7 CM (ref 3.9–5.3)
ECHO LV INTERNAL DIMENSION SYSTOLIC INDEX: 1.57 CM/M2
ECHO LV INTERNAL DIMENSION SYSTOLIC: 3 CM
ECHO LV IVSD: 1 CM (ref 0.6–0.9)
ECHO LV MASS 2D: 164.5 G (ref 67–162)
ECHO LV MASS INDEX 2D: 86.1 G/M2 (ref 43–95)
ECHO LV POSTERIOR WALL DIASTOLIC: 1 CM (ref 0.6–0.9)
ECHO LV RELATIVE WALL THICKNESS RATIO: 0.43
ECHO LVOT AREA: 3.1 CM2
ECHO LVOT DIAM: 2 CM
ECHO LVOT PEAK GRADIENT: 6 MMHG
ECHO LVOT PEAK VELOCITY: 1.2 M/S
ECHO MV A VELOCITY: 0.68 M/S
ECHO MV E DECELERATION TIME (DT): 215.7 MS
ECHO MV E VELOCITY: 0.6 M/S
ECHO MV E/A RATIO: 0.88
ECHO MV REGURGITANT PEAK GRADIENT: 7 MMHG
ECHO MV REGURGITANT PEAK VELOCITY: 1.3 M/S
ECHO PV MAX VELOCITY: 1.2 M/S
ECHO PV PEAK GRADIENT: 6 MMHG
ECHO RIGHT VENTRICULAR SYSTOLIC PRESSURE (RVSP): 26 MMHG
ECHO RV FREE WALL PEAK S': 15 CM/S
ECHO RV TAPSE: 1.5 CM (ref 1.7–?)
ECHO TV REGURGITANT MAX VELOCITY: 2.41 M/S
ECHO TV REGURGITANT PEAK GRADIENT: 23 MMHG

## 2023-11-10 PROCEDURE — 93356 MYOCRD STRAIN IMG SPCKL TRCK: CPT | Performed by: INTERNAL MEDICINE

## 2023-11-10 PROCEDURE — 93306 TTE W/DOPPLER COMPLETE: CPT | Performed by: INTERNAL MEDICINE

## 2023-11-10 PROCEDURE — 93306 TTE W/DOPPLER COMPLETE: CPT

## 2023-11-13 ENCOUNTER — TELEPHONE (OUTPATIENT)
Age: 68
End: 2023-11-13

## 2023-11-13 NOTE — TELEPHONE ENCOUNTER
Patient  called    Regarding:   ECHO Results    Request:    Please call to discuss/review        Pt will also be in the office tomorrow morning around 8:30 to repeat blood work, if it is easier to discuss at that time

## 2023-11-13 NOTE — RESULT ENCOUNTER NOTE
The ultrasound of your heart was largely normal with some mild changes likely related to high blood pressure which are not causing any issues currently and there are no obvious causes for the murmur.

## 2023-11-13 NOTE — TELEPHONE ENCOUNTER
Spoke with patient regarding her lab results. Informed patient again that Dr. Maryjane Noguera recommends coming by the office for follow up lab work. Patient verbalized understanding.

## 2023-11-14 DIAGNOSIS — D64.9 ANEMIA, UNSPECIFIED TYPE: ICD-10-CM

## 2023-11-14 LAB
BASOPHILS # BLD: 0 K/UL (ref 0–0.1)
BASOPHILS NFR BLD: 1 % (ref 0–1)
DIFFERENTIAL METHOD BLD: ABNORMAL
EOSINOPHIL # BLD: 0.4 K/UL (ref 0–0.4)
EOSINOPHIL NFR BLD: 6 % (ref 0–7)
ERYTHROCYTE [DISTWIDTH] IN BLOOD BY AUTOMATED COUNT: 13.9 % (ref 11.5–14.5)
FERRITIN SERPL-MCNC: 288 NG/ML (ref 8–252)
FOLATE SERPL-MCNC: 17.7 NG/ML (ref 5–21)
HCT VFR BLD AUTO: 32.8 % (ref 35–47)
HGB BLD-MCNC: 10.5 G/DL (ref 11.5–16)
IMM GRANULOCYTES # BLD AUTO: 0 K/UL (ref 0–0.04)
IMM GRANULOCYTES NFR BLD AUTO: 0 % (ref 0–0.5)
IRON SATN MFR SERPL: 26 % (ref 20–50)
IRON SERPL-MCNC: 84 UG/DL (ref 35–150)
LYMPHOCYTES # BLD: 3.1 K/UL (ref 0.8–3.5)
LYMPHOCYTES NFR BLD: 50 % (ref 12–49)
MCH RBC QN AUTO: 32.6 PG (ref 26–34)
MCHC RBC AUTO-ENTMCNC: 32 G/DL (ref 30–36.5)
MCV RBC AUTO: 101.9 FL (ref 80–99)
MONOCYTES # BLD: 0.4 K/UL (ref 0–1)
MONOCYTES NFR BLD: 7 % (ref 5–13)
NEUTS SEG # BLD: 2.2 K/UL (ref 1.8–8)
NEUTS SEG NFR BLD: 36 % (ref 32–75)
NRBC # BLD: 0 K/UL (ref 0–0.01)
NRBC BLD-RTO: 0 PER 100 WBC
PLATELET # BLD AUTO: 340 K/UL (ref 150–400)
PMV BLD AUTO: 9.3 FL (ref 8.9–12.9)
RBC # BLD AUTO: 3.22 M/UL (ref 3.8–5.2)
TIBC SERPL-MCNC: 322 UG/DL (ref 250–450)
VIT B12 SERPL-MCNC: 74 PG/ML (ref 193–986)
WBC # BLD AUTO: 6.1 K/UL (ref 3.6–11)

## 2023-11-15 NOTE — RESULT ENCOUNTER NOTE
Your B12 level is quite low which is likely the cause of your anemia. I sent a prescription for a B12 supplement (cyanocobalamin) to your pharmacy. We will reassess your b12 levels at your next visit.     Your iron levels are normal, you do not need to take the iron supplement.

## 2024-05-15 DIAGNOSIS — E53.8 LOW SERUM VITAMIN B12: ICD-10-CM

## 2024-05-16 RX ORDER — UBIDECARENONE 75 MG
100 CAPSULE ORAL DAILY
Qty: 100 TABLET | Refills: 1 | Status: SHIPPED | OUTPATIENT
Start: 2024-05-16 | End: 2025-05-16

## 2024-06-05 ENCOUNTER — TELEPHONE (OUTPATIENT)
Age: 69
End: 2024-06-05

## 2024-10-09 ENCOUNTER — TELEPHONE (OUTPATIENT)
Age: 69
End: 2024-10-09

## 2024-10-09 NOTE — TELEPHONE ENCOUNTER
----- Message from Jr WALSH sent at 10/9/2024 12:35 PM EDT -----  Regarding: E[facility] Appointment Request  E[facility] Appointment Request    Patient needs appointment for E[facility] Appointment Type: New to Provider.    Patient Requested Dates(s): week of october 20   Patient Requested Time: morning  time   Provider Name:Eve Steward MD     Reason for Appointment Request: Other   -patient says she change her PCP since Dr. Steward is the one on her insurance.   -patient wants to have a yearly physical   --------------------------------------------------------------------------------------------------------------------------    Relationship to Patient: Self     Call Back Information: OK to leave message on voicemail  Preferred Call Back Number: Phone [phone]

## 2024-10-09 NOTE — TELEPHONE ENCOUNTER
----- Message from Jr WALSH sent at 10/9/2024 12:35 PM EDT -----  Regarding: E[facility] Appointment Request  E[facility] Appointment Request    Patient needs appointment for ECC Appointment Type: New to Provider.    Patient Requested Dates(s): week of october 20   Patient Requested Time: morning  time   Provider Name:Eve Steward MD     Reason for Appointment Request: Other   -patient says she change her PCP since Dr. Steward is the one on her insurance.   -patient wants to have a yearly physical   --------------------------------------------------------------------------------------------------------------------------    Relationship to Patient: Self     Call Back Information: OK to leave message on voicemail  Preferred Call Back Number: Phone [phone]

## 2024-11-04 ENCOUNTER — TELEPHONE (OUTPATIENT)
Age: 69
End: 2024-11-04

## 2024-11-04 DIAGNOSIS — I10 PRIMARY HYPERTENSION: ICD-10-CM

## 2024-11-04 NOTE — TELEPHONE ENCOUNTER
Patient last seen for Med. AWV 10/30/23 & has an appt to [facility] with Dr. Steward 4/2025 states she needs a call back to be advised what to do to get her Medicare Advantage Plan Renewal Form to be completed. Patient needs to know can this be dropped off or is appt required. Please call to advise. Thank you

## 2024-11-04 NOTE — TELEPHONE ENCOUNTER
Patient requesting a refill for losartan (COZAAR) 50 MG tablet [[id number]]     Patient is going out of town on Sunday, 11/10, and won't be back until after Thanksgiving.     Patient confirmed pharmacy.

## 2024-11-04 NOTE — TELEPHONE ENCOUNTER
Patient presents to the office to drop off Provider Confirmation form for Kevin Crane MD. Patient advised of potential 10-14 business day turnaround time for form completion & may incur a fee of $25.00. This has been fully explained to the patient, who indicates understanding.

## 2024-11-05 RX ORDER — LOSARTAN POTASSIUM 50 MG/1
50 TABLET ORAL DAILY
Qty: 90 TABLET | Refills: 1 | Status: SHIPPED | OUTPATIENT
Start: 2024-11-05 | End: 2024-11-08 | Stop reason: SDUPTHER

## 2024-11-06 NOTE — TELEPHONE ENCOUNTER
Pt calling for an update on the form dropped off     Please call to advise    States Due date is Dec 7 and pt will be in [place] at that time, so will not be able to follow up.      Notify pt if any problems  -- cell or mychart

## 2024-11-08 DIAGNOSIS — I10 PRIMARY HYPERTENSION: ICD-10-CM

## 2024-11-08 RX ORDER — LOSARTAN POTASSIUM 50 MG/1
50 TABLET ORAL DAILY
Qty: 30 TABLET | Refills: 1 | Status: SHIPPED | OUTPATIENT
Start: 2024-11-08

## 2024-11-08 NOTE — TELEPHONE ENCOUNTER
PCP: Kevin Crane MD    Last appt: 10/30/2023  Future Appointments   Date Time Provider Department Center   12/19/2024 11:20 AM Eve Steward MD [facility] DEP       Requested Prescriptions     Pending Prescriptions Disp Refills    losartan (COZAAR) 50 MG tablet 90 tablet 1     Sig: Take 1 tablet by mouth daily

## 2024-11-08 NOTE — TELEPHONE ENCOUNTER
Patient states she needs a call back to discuss Dr. Crane doing refill on Blood Pressure medication but also needs to get him to fill out Forms for Medicare on that BP Medication & can't wait until her New Patient appt with Dr. Steward. Please call to discuss & advise on getting this done by deadline which is before NP appt with Dr. Steward. Thank you      Patient states she is going to be in [place] for 3 weeks & Time Difference Starting this Sunday, 11/10/24.

## 2024-12-06 ENCOUNTER — TELEPHONE (OUTPATIENT)
Age: 69
End: 2024-12-06

## 2024-12-06 NOTE — TELEPHONE ENCOUNTER
Called, no answer left message to call office back.      Re: form that was dropped off. Per Dr. Crane: he can not sign the form, pt does not qualify.

## 2024-12-19 ENCOUNTER — OFFICE VISIT (OUTPATIENT)
Age: 69
End: 2024-12-19
Payer: MEDICARE

## 2024-12-19 VITALS
BODY MASS INDEX: 28.9 KG/M2 | RESPIRATION RATE: 16 BRPM | HEIGHT: 66 IN | SYSTOLIC BLOOD PRESSURE: 137 MMHG | DIASTOLIC BLOOD PRESSURE: 76 MMHG | TEMPERATURE: 98.8 F | HEART RATE: 70 BPM | OXYGEN SATURATION: 99 % | WEIGHT: 179.8 LBS

## 2024-12-19 DIAGNOSIS — Z78.0 POST-MENOPAUSAL: ICD-10-CM

## 2024-12-19 DIAGNOSIS — B07.0 PLANTAR WART: ICD-10-CM

## 2024-12-19 DIAGNOSIS — E53.8 LOW SERUM VITAMIN B12: ICD-10-CM

## 2024-12-19 DIAGNOSIS — E78.1 PURE HYPERTRIGLYCERIDEMIA: ICD-10-CM

## 2024-12-19 DIAGNOSIS — I10 PRIMARY HYPERTENSION: ICD-10-CM

## 2024-12-19 DIAGNOSIS — Z00.00 MEDICARE ANNUAL WELLNESS VISIT, SUBSEQUENT: Primary | ICD-10-CM

## 2024-12-19 PROCEDURE — 1126F AMNT PAIN NOTED NONE PRSNT: CPT | Performed by: FAMILY MEDICINE

## 2024-12-19 PROCEDURE — 3075F SYST BP GE 130 - 139MM HG: CPT | Performed by: FAMILY MEDICINE

## 2024-12-19 PROCEDURE — 1159F MED LIST DOCD IN RCRD: CPT | Performed by: FAMILY MEDICINE

## 2024-12-19 PROCEDURE — 3078F DIAST BP <80 MM HG: CPT | Performed by: FAMILY MEDICINE

## 2024-12-19 PROCEDURE — 1123F ACP DISCUSS/DSCN MKR DOCD: CPT | Performed by: FAMILY MEDICINE

## 2024-12-19 PROCEDURE — G0439 PPPS, SUBSEQ VISIT: HCPCS | Performed by: FAMILY MEDICINE

## 2024-12-19 SDOH — ECONOMIC STABILITY: FOOD INSECURITY: WITHIN THE PAST 12 MONTHS, YOU WORRIED THAT YOUR FOOD WOULD RUN OUT BEFORE YOU GOT MONEY TO BUY MORE.: NEVER TRUE

## 2024-12-19 SDOH — ECONOMIC STABILITY: FOOD INSECURITY: WITHIN THE PAST 12 MONTHS, THE FOOD YOU BOUGHT JUST DIDN'T LAST AND YOU DIDN'T HAVE MONEY TO GET MORE.: NEVER TRUE

## 2024-12-19 SDOH — ECONOMIC STABILITY: INCOME INSECURITY: HOW HARD IS IT FOR YOU TO PAY FOR THE VERY BASICS LIKE FOOD, HOUSING, MEDICAL CARE, AND HEATING?: NOT HARD AT ALL

## 2024-12-19 ASSESSMENT — PATIENT HEALTH QUESTIONNAIRE - PHQ9
2. FEELING DOWN, DEPRESSED OR HOPELESS: NOT AT ALL
SUM OF ALL RESPONSES TO PHQ9 QUESTIONS 1 & 2: 0
1. LITTLE INTEREST OR PLEASURE IN DOING THINGS: NOT AT ALL
SUM OF ALL RESPONSES TO PHQ QUESTIONS 1-9: 0

## 2024-12-19 ASSESSMENT — LIFESTYLE VARIABLES
HOW OFTEN DO YOU HAVE A DRINK CONTAINING ALCOHOL: 2-4 TIMES A MONTH
HOW MANY STANDARD DRINKS CONTAINING ALCOHOL DO YOU HAVE ON A TYPICAL DAY: 1 OR 2

## 2024-12-19 NOTE — PROGRESS NOTES
\"Have you been to the ER, urgent care clinic since your last visit?  Hospitalized since your last visit?\"    NO    “Have you seen or consulted any other health care providers outside our system since your last visit?”    NO           
ROM, muscle strength 5/5 all groups.    NEURO: Alert and oriented x 3.  Cranial nerves grossly intact.  No focal sensory or motor deficits noted.   SKIN: Warm. Dry. No rashes or other lesions noted. Wart underside R foot    ASSESSMENT/ PLAN: Ritika was seen today for new patient and medicare awv.    Diagnoses and all orders for this visit:    Medicare annual wellness visit, subsequent  1. Medicare Annual Wellness Visit  See attached note.     -     DEXA BONE DENSITY AXIAL SKELETON; Future  -     Lipid Panel; Future    Post-menopausal  Normal dexa in 2022. Ordered repeat for her to schedule.   -     DEXA BONE DENSITY AXIAL SKELETON; Future    Primary hypertension  BP seems to be well controlled. I recommended continuing current dose of losartan 50mg, eating a low sodium diet, and increasing exercise. Recheck CMP, CBC.   -     CBC with Auto Differential; Future  -     Comprehensive Metabolic Panel; Future    Low serum vitamin B12  She continues on B12 100mcg daily. Continue current dose. Ordered B12.   -     Vitamin B12 & Folate; Future    Pure hypertriglyceridemia  Elevates triglycerides on last labs in 10/23. Diet controlled. Ordered lipids.   -     Lipid Panel; Future    Plantar wart  Placed referral today to podiatry for removal of plantar wart on R foot.   -     BOB[facility] - Epi Lucia DPM, Podiatry, [place]       Diagnosis Orders   1. Medicare annual wellness visit, subsequent  DEXA BONE DENSITY AXIAL SKELETON    Lipid Panel      2. Post-menopausal  DEXA BONE DENSITY AXIAL SKELETON      3. Primary hypertension  CBC with Auto Differential    Comprehensive Metabolic Panel      4. Low serum vitamin B12  Vitamin B12 & Folate      5. Pure hypertriglyceridemia  Lipid Panel      6. Plantar wart  Epi Disla DPM, Podiatry, [place]           No follow-up provider specified.    I have reviewed the patient's medications and risks/side effects/benefits were discussed. Diagnosis(-es) explained to

## 2024-12-19 NOTE — PATIENT INSTRUCTIONS
to screen for glaucoma; cataracts, macular degeneration, and other eye disorders.  A preventive dental visit is recommended every 6 months.  Try to get at least 150 minutes of exercise per week or 10,000 steps per day on a pedometer .  Order or download the FREE \"Exercise & Physical Activity: Your Everyday Guide\" from [employer] [employer]. Call 1-716.901.2742 or search [employer] [place] [employer] online.  You need 4316-1329 mg of calcium and 7427-4118 IU of vitamin D per day. It is possible to meet your calcium requirement with diet alone, but a vitamin D supplement is usually necessary to meet this goal.  When exposed to the sun, use a sunscreen that protects against both UVA and UVB radiation with an SPF of 30 or greater. Reapply every 2 to 3 hours or after sweating, drying off with a towel, or swimming.  Always wear a seat belt when traveling in a car. Always wear a helmet when riding a bicycle or motorcycle.

## 2024-12-20 LAB
ALBUMIN SERPL-MCNC: 4.9 G/DL (ref 3.9–4.9)
ALP SERPL-CCNC: 105 IU/L (ref 44–121)
ALT SERPL-CCNC: 16 IU/L (ref 0–32)
AST SERPL-CCNC: 20 IU/L (ref 0–40)
BASOPHILS # BLD AUTO: 0 X10E3/UL (ref 0–0.2)
BASOPHILS NFR BLD AUTO: 0 %
BILIRUB SERPL-MCNC: 0.6 MG/DL (ref 0–1.2)
BUN SERPL-MCNC: 14 MG/DL (ref 8–27)
BUN/CREAT SERPL: 19 (ref 12–28)
CALCIUM SERPL-MCNC: 9.5 MG/DL (ref 8.7–10.3)
CHLORIDE SERPL-SCNC: 103 MMOL/L (ref 96–106)
CHOLEST SERPL-MCNC: 225 MG/DL (ref 100–199)
CO2 SERPL-SCNC: 24 MMOL/L (ref 20–29)
CREAT SERPL-MCNC: 0.75 MG/DL (ref 0.57–1)
EGFRCR SERPLBLD CKD-EPI 2021: 87 ML/MIN/1.73
EOSINOPHIL # BLD AUTO: 0.4 X10E3/UL (ref 0–0.4)
EOSINOPHIL NFR BLD AUTO: 6 %
ERYTHROCYTE [DISTWIDTH] IN BLOOD BY AUTOMATED COUNT: 12.6 % (ref 11.7–15.4)
FOLATE SERPL-MCNC: 8.9 NG/ML
GLOBULIN SER CALC-MCNC: 3.5 G/DL (ref 1.5–4.5)
GLUCOSE SERPL-MCNC: 90 MG/DL (ref 70–99)
HCT VFR BLD AUTO: 37.7 % (ref 34–46.6)
HDLC SERPL-MCNC: 65 MG/DL
HGB BLD-MCNC: 12 G/DL (ref 11.1–15.9)
IMM GRANULOCYTES # BLD AUTO: 0 X10E3/UL (ref 0–0.1)
IMM GRANULOCYTES NFR BLD AUTO: 0 %
LDLC SERPL CALC-MCNC: 135 MG/DL (ref 0–99)
LYMPHOCYTES # BLD AUTO: 3.4 X10E3/UL (ref 0.7–3.1)
LYMPHOCYTES NFR BLD AUTO: 49 %
MCH RBC QN AUTO: 28.1 PG (ref 26.6–33)
MCHC RBC AUTO-ENTMCNC: 31.8 G/DL (ref 31.5–35.7)
MCV RBC AUTO: 88 FL (ref 79–97)
MONOCYTES # BLD AUTO: 0.5 X10E3/UL (ref 0.1–0.9)
MONOCYTES NFR BLD AUTO: 7 %
NEUTROPHILS # BLD AUTO: 2.7 X10E3/UL (ref 1.4–7)
NEUTROPHILS NFR BLD AUTO: 38 %
PLATELET # BLD AUTO: 294 X10E3/UL (ref 150–450)
POTASSIUM SERPL-SCNC: 4.1 MMOL/L (ref 3.5–5.2)
PROT SERPL-MCNC: 8.4 G/DL (ref 6–8.5)
RBC # BLD AUTO: 4.27 X10E6/UL (ref 3.77–5.28)
SODIUM SERPL-SCNC: 143 MMOL/L (ref 134–144)
TRIGL SERPL-MCNC: 140 MG/DL (ref 0–149)
VIT B12 SERPL-MCNC: 752 PG/ML (ref 232–1245)
VLDLC SERPL CALC-MCNC: 25 MG/DL (ref 5–40)
WBC # BLD AUTO: 6.9 X10E3/UL (ref 3.4–10.8)

## 2025-01-16 ENCOUNTER — HOSPITAL ENCOUNTER (OUTPATIENT)
Age: 70
Discharge: HOME | End: 2025-01-19
Payer: MEDICARE

## 2025-01-16 DIAGNOSIS — Z00.00 MEDICARE ANNUAL WELLNESS VISIT, SUBSEQUENT: ICD-10-CM

## 2025-01-16 DIAGNOSIS — Z78.0 POST-MENOPAUSAL: ICD-10-CM

## 2025-01-16 PROCEDURE — 77080 DXA BONE DENSITY AXIAL: CPT

## 2025-05-20 DIAGNOSIS — I10 PRIMARY HYPERTENSION: ICD-10-CM

## 2025-05-20 NOTE — TELEPHONE ENCOUNTER
PCP: Eve Steward MD    Last appt:   12/19/2024    No future appointments.    Requested Prescriptions     Pending Prescriptions Disp Refills    losartan (COZAAR) 50 MG tablet 90 tablet 1     Sig: Take 1 tablet by mouth daily

## 2025-05-20 NOTE — TELEPHONE ENCOUNTER
Pt states that med went to pharm as 30 day and should have been 90 day.    She would like this updated in file so it will not happen again.    This is the losartan.      Please be sure this is entered into med/chart.

## 2025-05-23 RX ORDER — LOSARTAN POTASSIUM 50 MG/1
50 TABLET ORAL DAILY
Qty: 90 TABLET | Refills: 3 | Status: SHIPPED | OUTPATIENT
Start: 2025-05-23

## 2025-08-15 ENCOUNTER — TELEPHONE (OUTPATIENT)
Age: 70
End: 2025-08-15

## 2025-08-15 DIAGNOSIS — L40.9 PSORIASIS: Primary | ICD-10-CM

## (undated) DEVICE — SPECIMEN TRAP QUAD CHMBR -- TRAPEASE

## (undated) DEVICE — SNARE ENDOSCP M L240CM W27MM SHTH DIA2.4MM CHN 2.8MM OVL

## (undated) DEVICE — REM POLYHESIVE ADULT PATIENT RETURN ELECTRODE: Brand: VALLEYLAB